# Patient Record
Sex: FEMALE | Race: BLACK OR AFRICAN AMERICAN | Employment: OTHER | URBAN - METROPOLITAN AREA
[De-identification: names, ages, dates, MRNs, and addresses within clinical notes are randomized per-mention and may not be internally consistent; named-entity substitution may affect disease eponyms.]

---

## 2020-07-07 ENCOUNTER — APPOINTMENT (OUTPATIENT)
Dept: GENERAL RADIOLOGY | Age: 55
End: 2020-07-07
Attending: EMERGENCY MEDICINE
Payer: MEDICARE

## 2020-07-07 ENCOUNTER — HOSPITAL ENCOUNTER (EMERGENCY)
Age: 55
Discharge: HOME | End: 2020-07-07
Attending: EMERGENCY MEDICINE
Payer: MEDICARE

## 2020-07-07 VITALS
OXYGEN SATURATION: 99 % | TEMPERATURE: 98.2 F | SYSTOLIC BLOOD PRESSURE: 190 MMHG | RESPIRATION RATE: 18 BRPM | HEIGHT: 63 IN | DIASTOLIC BLOOD PRESSURE: 128 MMHG | WEIGHT: 209 LBS | HEART RATE: 82 BPM | BODY MASS INDEX: 37.03 KG/M2

## 2020-07-07 DIAGNOSIS — I10 HYPERTENSION, UNSPECIFIED TYPE: ICD-10-CM

## 2020-07-07 DIAGNOSIS — M17.11 PRIMARY OSTEOARTHRITIS OF RIGHT KNEE: ICD-10-CM

## 2020-07-07 DIAGNOSIS — M16.7 OTHER SECONDARY OSTEOARTHRITIS OF RIGHT HIP: Primary | ICD-10-CM

## 2020-07-07 LAB
ANION GAP SERPL CALC-SCNC: 7 MMOL/L (ref 7–16)
BASOPHILS # BLD: 0 K/UL (ref 0–0.2)
BASOPHILS NFR BLD: 0 % (ref 0–2)
BUN SERPL-MCNC: 14 MG/DL (ref 6–23)
CALCIUM SERPL-MCNC: 9.3 MG/DL (ref 8.3–10.4)
CHLORIDE SERPL-SCNC: 105 MMOL/L (ref 98–107)
CO2 SERPL-SCNC: 26 MMOL/L (ref 21–32)
CREAT SERPL-MCNC: 1.02 MG/DL (ref 0.6–1)
DIFFERENTIAL METHOD BLD: ABNORMAL
EOSINOPHIL # BLD: 0.1 K/UL (ref 0–0.8)
EOSINOPHIL NFR BLD: 1 % (ref 0.5–7.8)
ERYTHROCYTE [DISTWIDTH] IN BLOOD BY AUTOMATED COUNT: 12.6 % (ref 11.9–14.6)
GLUCOSE SERPL-MCNC: 305 MG/DL (ref 65–100)
HCT VFR BLD AUTO: 46 % (ref 35.8–46.3)
HGB BLD-MCNC: 14.2 G/DL (ref 11.7–15.4)
IMM GRANULOCYTES # BLD AUTO: 0 K/UL (ref 0–0.5)
IMM GRANULOCYTES NFR BLD AUTO: 0 % (ref 0–5)
LYMPHOCYTES # BLD: 2.8 K/UL (ref 0.5–4.6)
LYMPHOCYTES NFR BLD: 40 % (ref 13–44)
MCH RBC QN AUTO: 28.9 PG (ref 26.1–32.9)
MCHC RBC AUTO-ENTMCNC: 30.9 G/DL (ref 31.4–35)
MCV RBC AUTO: 93.7 FL (ref 79.6–97.8)
MONOCYTES # BLD: 0.5 K/UL (ref 0.1–1.3)
MONOCYTES NFR BLD: 7 % (ref 4–12)
NEUTS SEG # BLD: 3.5 K/UL (ref 1.7–8.2)
NEUTS SEG NFR BLD: 51 % (ref 43–78)
NRBC # BLD: 0 K/UL (ref 0–0.2)
PLATELET # BLD AUTO: 326 K/UL (ref 150–450)
PMV BLD AUTO: 10.5 FL (ref 9.4–12.3)
POTASSIUM SERPL-SCNC: 4.8 MMOL/L (ref 3.5–5.1)
RBC # BLD AUTO: 4.91 M/UL (ref 4.05–5.2)
SODIUM SERPL-SCNC: 138 MMOL/L (ref 136–145)
WBC # BLD AUTO: 6.9 K/UL (ref 4.3–11.1)

## 2020-07-07 PROCEDURE — 80048 BASIC METABOLIC PNL TOTAL CA: CPT

## 2020-07-07 PROCEDURE — 96374 THER/PROPH/DIAG INJ IV PUSH: CPT

## 2020-07-07 PROCEDURE — 73502 X-RAY EXAM HIP UNI 2-3 VIEWS: CPT

## 2020-07-07 PROCEDURE — 74011250636 HC RX REV CODE- 250/636: Performed by: EMERGENCY MEDICINE

## 2020-07-07 PROCEDURE — 73562 X-RAY EXAM OF KNEE 3: CPT

## 2020-07-07 PROCEDURE — 99283 EMERGENCY DEPT VISIT LOW MDM: CPT

## 2020-07-07 PROCEDURE — 85025 COMPLETE CBC W/AUTO DIFF WBC: CPT

## 2020-07-07 RX ORDER — DEXAMETHASONE 2 MG/1
TABLET ORAL
Qty: 32 TAB | Refills: 0 | Status: SHIPPED | OUTPATIENT
Start: 2020-07-07 | End: 2021-06-09

## 2020-07-07 RX ORDER — HYDROCODONE BITARTRATE AND ACETAMINOPHEN 5; 325 MG/1; MG/1
1 TABLET ORAL
Qty: 12 TAB | Refills: 0 | Status: SHIPPED | OUTPATIENT
Start: 2020-07-07 | End: 2020-07-10

## 2020-07-07 RX ORDER — HYDROMORPHONE HYDROCHLORIDE 1 MG/ML
1 INJECTION, SOLUTION INTRAMUSCULAR; INTRAVENOUS; SUBCUTANEOUS
Status: DISCONTINUED | OUTPATIENT
Start: 2020-07-07 | End: 2020-07-08 | Stop reason: HOSPADM

## 2020-07-07 RX ORDER — CLONIDINE HYDROCHLORIDE 0.1 MG/1
0.2 TABLET ORAL 2 TIMES DAILY
Qty: 120 TAB | Refills: 0 | Status: SHIPPED | OUTPATIENT
Start: 2020-07-07 | End: 2020-08-06

## 2020-07-07 RX ADMIN — HYDROMORPHONE HYDROCHLORIDE 1 MG: 1 INJECTION, SOLUTION INTRAMUSCULAR; INTRAVENOUS; SUBCUTANEOUS at 21:23

## 2020-07-07 NOTE — ED TRIAGE NOTES
Arrives with face mask in place. Reports right groin pain radiating down into thigh extending to right knee. Onset approx 1week ago. Denies injury/trauma. Denies swelling to site. Denies urinary symptoms. Describes as \"throbbing\". Attempted diclofenac, motrin, tylenol, naproxen without relief. +pms to right foot. HTN on arrival, hx of, has not taken meds today. Seen at [facility] 6/10 for ace inhibitor cough, taken off lisinopril at that time. Instructed to take clonidine bid.

## 2020-07-08 NOTE — DISCHARGE INSTRUCTIONS
Patient Education        Arthritis: Care Instructions  Overview  Arthritis, also called osteoarthritis, is a breakdown of the cartilage that cushions your joints. When the cartilage wears down, your bones rub against each other. This causes pain and stiffness. Many people have some arthritis as they age. Arthritis most often affects the joints of the spine, hands, hips, knees, or feet. Arthritis never goes away completely. But medicine and home treatment can help reduce pain and help you stay active. Follow-up care is a key part of your treatment and safety. Be sure to make and go to all appointments, and call your doctor if you are having problems. It's also a good idea to know your test results and keep a list of the medicines you take. How can you care for yourself at home? · Stay at a healthy weight. Being overweight puts extra strain on your joints. · Talk to your doctor or physical therapist about exercises that will help ease joint pain. ? Stretch. You may enjoy gentle forms of yoga to help keep your joints and muscles flexible. ? Walk instead of jog. Other types of exercise that are less stressful on the joints include riding a bike, swimming, shonna chi, or water exercise. ? Lift weights. Strong muscles help reduce stress on your joints. Stronger thigh muscles, for example, take some of the stress off of the knees and hips. Learn the right way to lift weights so you don't make joint pain worse. · Take your medicines exactly as prescribed. Call your doctor if you think you are having a problem with your medicine. · Take pain medicines exactly as directed. ? If the doctor gave you a prescription medicine for pain, take it as prescribed. ? If you are not taking a prescription pain medicine, ask your doctor if you can take an over-the-counter medicine. · Use a cane, crutch, walker, or another device if you need help to get around. These can help rest your joints.  You also can use other things to make life easier, such as a higher toilet seat and padded handles on kitchen utensils. · Do not sit in low chairs. They can make it hard to get up. · Put heat or cold on your sore joints as needed. Use whichever helps you most. You can also switch between hot and cold packs. ? Apply heat 2 or 3 times a day for 20 to 30 minutes--using a heating pad, hot shower, or hot pack--to relieve pain and stiffness. But don't use heat on a swollen joint. ? Put ice or a cold pack on your sore joint for 10 to 20 minutes at a time. Put a thin cloth between the ice and your skin. When should you call for help? Call your doctor now or seek immediate medical care if:  · You have sudden swelling, warmth, or pain in any joint. · You have joint pain and a fever or rash. · You have such bad pain that you cannot use a joint. Watch closely for changes in your health, and be sure to contact your doctor if:  · You have mild joint symptoms that continue even with more than 6 weeks of care at home. · You have stomach pain or other problems with your medicine. Where can you learn more? Go to http://marlaConvrrtalisha.info/  Enter T912 in the search box to learn more about \"Arthritis: Care Instructions. \"  Current as of: December 9, 2019               Content Version: 12.5  © 8789-0230 [employer]. Care instructions adapted under license by [employer] (which disclaims liability or warranty for this information). If you have questions about a medical condition or this instruction, always ask your healthcare professional. [place] [address] any warranty or liability for your use of this information. Patient Education        DASH Diet: Care Instructions  Your Care Instructions     The DASH diet is an eating plan that can help lower your blood pressure. DASH stands for Dietary Approaches to Stop Hypertension. Hypertension is high blood pressure.   The DASH diet focuses on eating foods that are high in calcium, potassium, and magnesium. These nutrients can lower blood pressure. The foods that are highest in these nutrients are fruits, vegetables, low-fat dairy products, nuts, seeds, and legumes. But taking calcium, potassium, and magnesium supplements instead of eating foods that are high in those nutrients does not have the same effect. The DASH diet also includes whole grains, fish, and poultry. The DASH diet is one of several lifestyle changes your doctor may recommend to lower your high blood pressure. Your doctor may also want you to decrease the amount of sodium in your diet. Lowering sodium while following the DASH diet can lower blood pressure even further than just the DASH diet alone. Follow-up care is a key part of your treatment and safety. Be sure to make and go to all appointments, and call your doctor if you are having problems. It's also a good idea to know your test results and keep a list of the medicines you take. How can you care for yourself at home? Following the DASH diet  · Eat 4 to 5 servings of fruit each day. A serving is 1 medium-sized piece of fruit, ½ cup chopped or canned fruit, 1/4 cup dried fruit, or 4 ounces (½ cup) of fruit juice. Choose fruit more often than fruit juice. · Eat 4 to 5 servings of vegetables each day. A serving is 1 cup of lettuce or raw leafy vegetables, ½ cup of chopped or cooked vegetables, or 4 ounces (½ cup) of vegetable juice. Choose vegetables more often than vegetable juice. · Get 2 to 3 servings of low-fat and fat-free dairy each day. A serving is 8 ounces of milk, 1 cup of yogurt, or 1 ½ ounces of cheese. · Eat 6 to 8 servings of grains each day. A serving is 1 slice of bread, 1 ounce of dry cereal, or ½ cup of cooked rice, pasta, or cooked cereal. Try to choose whole-grain products as much as possible. · Limit lean meat, poultry, and fish to 2 servings each day.  A serving is 3 ounces, about the size of a deck of cards.  · Eat 4 to 5 servings of nuts, seeds, and legumes (cooked dried beans, lentils, and split peas) each week. A serving is 1/3 cup of nuts, 2 tablespoons of seeds, or ½ cup of cooked beans or peas. · Limit fats and oils to 2 to 3 servings each day. A serving is 1 teaspoon of vegetable oil or 2 tablespoons of salad dressing. · Limit sweets and added sugars to 5 servings or less a week. A serving is 1 tablespoon jelly or jam, ½ cup sorbet, or 1 cup of lemonade. · Eat less than 2,300 milligrams (mg) of sodium a day. If you limit your sodium to 1,500 mg a day, you can lower your blood pressure even more. Tips for success  · Start small. Do not try to make dramatic changes to your diet all at once. You might feel that you are missing out on your favorite foods and then be more likely to not follow the plan. Make small changes, and stick with them. Once those changes become habit, add a few more changes. · Try some of the following:  ? Make it a goal to eat a fruit or vegetable at every meal and at snacks. This will make it easy to get the recommended amount of fruits and vegetables each day. ? Try yogurt topped with fruit and nuts for a snack or healthy dessert. ? Add lettuce, tomato, cucumber, and onion to sandwiches. ? Combine a ready-made pizza crust with low-fat mozzarella cheese and lots of vegetable toppings. Try using tomatoes, squash, spinach, broccoli, carrots, cauliflower, and onions. ? Have a variety of cut-up vegetables with a low-fat dip as an appetizer instead of chips and dip. ? Sprinkle sunflower seeds or chopped almonds over salads. Or try adding chopped walnuts or almonds to cooked vegetables. ? Try some vegetarian meals using beans and peas. Add garbanzo or kidney beans to salads. Make burritos and tacos with mashed weldon beans or black beans. Where can you learn more?   Go to http://marla-alisha.info/  Enter H967 in the search box to learn more about \"DASH Diet: Care Instructions. \"  Current as of: December 16, 2019               Content Version: 12.5  © 4125-0784 [employer]. Care instructions adapted under license by [employer] (which disclaims liability or warranty for this information). If you have questions about a medical condition or this instruction, always ask your healthcare professional. [address]  any warranty or liability for your use of this information. Patient Education        Hip Arthritis: Care Instructions  Your Care Instructions     Arthritis, also called osteoarthritis, is a breakdown of the tissue (cartilage) that cushions your joints. Many people have some arthritis as they age. When the cartilage in your hip joints wears down, your hip bone rubs against the hip socket. This causes pain and stiffness. Work with your doctor to find the right mix of treatments for your arthritis. There are things you can do at home to protect your hip joints, ease your pain, and help you stay active. But if your arthritis becomes so bad that you cannot walk, you may need surgery to replace the hip joint. Follow-up care is a key part of your treatment and safety. Be sure to make and go to all appointments, and call your doctor if you are having problems. It's also a good idea to know your test results and keep a list of the medicines you take. How can you care for yourself at home? · Stay at a healthy weight. Being overweight puts extra strain on your hip joints. · Talk to your doctor or physical therapist about exercises that will help ease hip pain. These tips may help. ? Stretch to help prevent stiffness and to prevent injury before you exercise. You may enjoy gentle forms of yoga to help keep your joints and muscles flexible. ? Walk instead of jog. Other types of exercise that are less stressful on the joints include riding a bike, swimming, and doing water exercise. ? Lift weights.  Strong muscles help reduce stress on your joints. Stronger thigh muscles, for example, take some of the stress off of the knees and hips. Learn the right way to lift weights so you do not make joint pain worse. · Take pain medicines exactly as directed. ? If the doctor gave you a prescription medicine for pain, take it as prescribed. ? If you are not taking a prescription pain medicine, ask your doctor if you can take an over-the-counter medicine. · Use a cane, crutch, walker, or another device if you need help to get around. These can help rest your hips. You also can use other things to make life easier, such as a higher toilet seat. · Do not sit in low chairs, which can make it painful to get up. · Put heat or cold on your sore hips as needed. Use whichever helps you most. You also can go back and forth between hot and cold packs. ? Apply heat 2 or 3 times a day for 20 to 30 minutes--using a heating pad, hot shower, or hot pack--to relieve pain and stiffness. ? Put ice or a cold pack on your sore hips for 10 to 20 minutes at a time to numb the area. Put a thin cloth between the ice and your skin. · Think about talking to your doctor about using capsaicin, a cream you apply to the skin for pain relief. When should you call for help? Call your doctor now or seek immediate medical care if:  · You have sudden swelling, warmth, or pain in any joint. · You have joint pain and a fever or rash. · You have such bad pain that you cannot use the joint. Watch closely for changes in your health, and be sure to contact your doctor if:  · You have mild joint symptoms that continue even with more than 6 weeks of care at home. · You do not get better as expected. · You have stomach pain or other problems with your medicine. Where can you learn more? Go to http://www.gray.com/  Enter T640 in the search box to learn more about \"Hip Arthritis: Care Instructions. \"  Current as of: December 9, 1383               WHTWYAB Version: 12.5  © 2225-9240 [employer]. Care instructions adapted under license by [employer] (which disclaims liability or warranty for this information). If you have questions about a medical condition or this instruction, always ask your healthcare professional. [address] any warranty or liability for your use of this information.

## 2020-07-08 NOTE — ED NOTES
I have reviewed discharge instructions with the patient. The patient verbalized understanding. Patient left ED via Discharge Method: wheelchair to Home with family. Opportunity for questions and clarification provided. Patient given 2 scripts. To continue your aftercare when you leave the hospital, you may receive an automated call from our care team to check in on how you are doing. This is a free service and part of our promise to provide the best care and service to meet your aftercare needs.  If you have questions, or wish to unsubscribe from this service please call [phone]. Thank you for Choosing our [facility] Emergency Department.

## 2020-09-10 ENCOUNTER — HOSPITAL ENCOUNTER (OUTPATIENT)
Dept: GENERAL RADIOLOGY | Age: 55
Discharge: HOME | End: 2020-09-10
Attending: ORTHOPAEDIC SURGERY
Payer: MEDICARE

## 2020-09-10 VITALS
TEMPERATURE: 98.1 F | HEART RATE: 78 BPM | OXYGEN SATURATION: 98 % | RESPIRATION RATE: 18 BRPM | DIASTOLIC BLOOD PRESSURE: 94 MMHG | HEIGHT: 67 IN | SYSTOLIC BLOOD PRESSURE: 145 MMHG | WEIGHT: 209 LBS | BODY MASS INDEX: 32.8 KG/M2

## 2020-09-10 DIAGNOSIS — M16.11 LOCALIZED OSTEOARTHROSIS OF RIGHT HIP: ICD-10-CM

## 2020-09-10 PROCEDURE — 74011000250 HC RX REV CODE- 250: Performed by: ORTHOPAEDIC SURGERY

## 2020-09-10 PROCEDURE — 74011000636 HC RX REV CODE- 636: Performed by: ORTHOPAEDIC SURGERY

## 2020-09-10 PROCEDURE — 74011250636 HC RX REV CODE- 250/636: Performed by: ORTHOPAEDIC SURGERY

## 2020-09-10 PROCEDURE — 20610 DRAIN/INJ JOINT/BURSA W/O US: CPT

## 2020-09-10 RX ORDER — LIDOCAINE HYDROCHLORIDE 20 MG/ML
1 INJECTION, SOLUTION EPIDURAL; INFILTRATION; INTRACAUDAL; PERINEURAL ONCE
Status: ACTIVE | OUTPATIENT
Start: 2020-09-10 | End: 2020-09-10

## 2020-09-10 RX ORDER — BUPIVACAINE HYDROCHLORIDE 2.5 MG/ML
1 INJECTION, SOLUTION EPIDURAL; INFILTRATION; INTRACAUDAL
Status: COMPLETED | OUTPATIENT
Start: 2020-09-10 | End: 2020-09-10

## 2020-09-10 RX ORDER — LIDOCAINE HYDROCHLORIDE 20 MG/ML
20 INJECTION, SOLUTION EPIDURAL; INFILTRATION; INTRACAUDAL; PERINEURAL ONCE
Status: ACTIVE | OUTPATIENT
Start: 2020-09-10 | End: 2020-09-10

## 2020-09-10 RX ORDER — LIDOCAINE HYDROCHLORIDE 20 MG/ML
1.5 INJECTION, SOLUTION EPIDURAL; INFILTRATION; INTRACAUDAL; PERINEURAL ONCE
Status: ACTIVE | OUTPATIENT
Start: 2020-09-10 | End: 2020-09-10

## 2020-09-10 RX ORDER — LIDOCAINE HYDROCHLORIDE 20 MG/ML
0.2 INJECTION, SOLUTION INFILTRATION; PERINEURAL
Status: ACTIVE | OUTPATIENT
Start: 2020-09-10 | End: 2020-09-10

## 2020-09-10 RX ORDER — LIDOCAINE HYDROCHLORIDE 20 MG/ML
1 INJECTION, SOLUTION EPIDURAL; INFILTRATION; INTRACAUDAL; PERINEURAL ONCE
Status: DISCONTINUED | OUTPATIENT
Start: 2020-09-10 | End: 2020-09-10 | Stop reason: SDUPTHER

## 2020-09-10 RX ORDER — METFORMIN HYDROCHLORIDE 1000 MG/1
1000 TABLET ORAL 2 TIMES DAILY WITH MEALS
COMMUNITY

## 2020-09-10 RX ORDER — DICLOFENAC POTASSIUM 50 MG/1
50 TABLET, FILM COATED ORAL
COMMUNITY

## 2020-09-10 RX ORDER — LOSARTAN POTASSIUM 50 MG/1
100 TABLET ORAL DAILY
COMMUNITY

## 2020-09-10 RX ORDER — ASPIRIN 81 MG/1
81 TABLET ORAL DAILY
COMMUNITY
End: 2021-06-17

## 2020-09-10 RX ORDER — METHYLPREDNISOLONE ACETATE 80 MG/ML
80 INJECTION, SUSPENSION INTRA-ARTICULAR; INTRALESIONAL; INTRAMUSCULAR; SOFT TISSUE ONCE
Status: COMPLETED | OUTPATIENT
Start: 2020-09-10 | End: 2020-09-10

## 2020-09-10 RX ORDER — QUETIAPINE FUMARATE 400 MG/1
400 TABLET, FILM COATED ORAL
COMMUNITY

## 2020-09-10 RX ORDER — METHYLPREDNISOLONE ACETATE 40 MG/ML
40 INJECTION, SUSPENSION INTRA-ARTICULAR; INTRALESIONAL; INTRAMUSCULAR; SOFT TISSUE
Status: ACTIVE | OUTPATIENT
Start: 2020-09-10 | End: 2020-09-10

## 2020-09-10 RX ADMIN — BUPIVACAINE HYDROCHLORIDE 4 MG: 2.5 INJECTION, SOLUTION EPIDURAL; INFILTRATION; INTRACAUDAL; PERINEURAL at 10:36

## 2020-09-10 RX ADMIN — METHYLPREDNISOLONE ACETATE 80 MG: 80 INJECTION, SUSPENSION INTRA-ARTICULAR; INTRALESIONAL; INTRAMUSCULAR; SOFT TISSUE at 11:40

## 2020-09-10 RX ADMIN — IOPAMIDOL 5 ML: 612 INJECTION, SOLUTION INTRATHECAL at 10:38

## 2021-05-12 ENCOUNTER — HOSPITAL ENCOUNTER (OUTPATIENT)
Dept: SURGERY | Age: 56
Discharge: HOME | End: 2021-05-12

## 2021-06-09 ENCOUNTER — HOSPITAL ENCOUNTER (OUTPATIENT)
Dept: SURGERY | Age: 56
Discharge: HOME | End: 2021-06-09
Attending: ORTHOPAEDIC SURGERY
Payer: MEDICARE

## 2021-06-09 VITALS
SYSTOLIC BLOOD PRESSURE: 172 MMHG | HEIGHT: 64 IN | BODY MASS INDEX: 42.27 KG/M2 | DIASTOLIC BLOOD PRESSURE: 104 MMHG | TEMPERATURE: 98.1 F | HEART RATE: 66 BPM | WEIGHT: 247.6 LBS | RESPIRATION RATE: 16 BRPM | OXYGEN SATURATION: 99 %

## 2021-06-09 LAB
ANION GAP SERPL CALC-SCNC: 4 MMOL/L (ref 7–16)
ATRIAL RATE: 81 BPM
BACTERIA SPEC CULT: NORMAL
BUN SERPL-MCNC: 10 MG/DL (ref 6–23)
CALCIUM SERPL-MCNC: 9.2 MG/DL (ref 8.3–10.4)
CALCULATED P AXIS, ECG09: 74 DEGREES
CALCULATED R AXIS, ECG10: 66 DEGREES
CALCULATED T AXIS, ECG11: 78 DEGREES
CHLORIDE SERPL-SCNC: 110 MMOL/L (ref 98–107)
CO2 SERPL-SCNC: 26 MMOL/L (ref 21–32)
CREAT SERPL-MCNC: 1.08 MG/DL (ref 0.6–1)
DIAGNOSIS, 93000: NORMAL
ERYTHROCYTE [DISTWIDTH] IN BLOOD BY AUTOMATED COUNT: 13.9 % (ref 11.9–14.6)
GLUCOSE SERPL-MCNC: 89 MG/DL (ref 65–100)
HCT VFR BLD AUTO: 37.7 % (ref 35.8–46.3)
HGB BLD-MCNC: 11.6 G/DL (ref 11.7–15.4)
MCH RBC QN AUTO: 29 PG (ref 26.1–32.9)
MCHC RBC AUTO-ENTMCNC: 30.8 G/DL (ref 31.4–35)
MCV RBC AUTO: 94.3 FL (ref 79.6–97.8)
NRBC # BLD: 0 K/UL (ref 0–0.2)
P-R INTERVAL, ECG05: 178 MS
PLATELET # BLD AUTO: 304 K/UL (ref 150–450)
PMV BLD AUTO: 9.3 FL (ref 9.4–12.3)
POTASSIUM SERPL-SCNC: 4.7 MMOL/L (ref 3.5–5.1)
Q-T INTERVAL, ECG07: 396 MS
QRS DURATION, ECG06: 84 MS
QTC CALCULATION (BEZET), ECG08: 460 MS
RBC # BLD AUTO: 4 M/UL (ref 4.05–5.2)
SERVICE CMNT-IMP: NORMAL
SODIUM SERPL-SCNC: 140 MMOL/L (ref 136–145)
VENTRICULAR RATE, ECG03: 81 BPM
WBC # BLD AUTO: 5.6 K/UL (ref 4.3–11.1)

## 2021-06-09 PROCEDURE — 85027 COMPLETE CBC AUTOMATED: CPT

## 2021-06-09 PROCEDURE — 80048 BASIC METABOLIC PNL TOTAL CA: CPT

## 2021-06-09 PROCEDURE — 77030027138 HC INCENT SPIROMETER -A

## 2021-06-09 PROCEDURE — 36415 COLL VENOUS BLD VENIPUNCTURE: CPT

## 2021-06-09 PROCEDURE — 87641 MR-STAPH DNA AMP PROBE: CPT

## 2021-06-09 PROCEDURE — 93005 ELECTROCARDIOGRAM TRACING: CPT | Performed by: ANESTHESIOLOGY

## 2021-06-09 RX ORDER — GLIMEPIRIDE 2 MG/1
2 TABLET ORAL
COMMUNITY

## 2021-06-09 RX ORDER — CHOLECALCIFEROL (VITAMIN D3) 125 MCG
5000 CAPSULE ORAL DAILY
COMMUNITY

## 2021-06-09 RX ORDER — CHOLECALCIFEROL (VITAMIN D3) 125 MCG
10 CAPSULE ORAL
COMMUNITY

## 2021-06-09 RX ORDER — HYDROCODONE BITARTRATE AND ACETAMINOPHEN 7.5; 325 MG/1; MG/1
1 TABLET ORAL
COMMUNITY
End: 2021-06-17

## 2021-06-09 NOTE — PERIOP NOTES
PLEASE CONTINUE TAKING ALL PRESCRIPTION MEDICATIONS UP TO THE DAY OF SURGERY UNLESS OTHERWISE DIRECTED BELOW. DISCONTINUE all vitamins and supplements 21 days prior to surgery. DISCONTINUE Non-Steriodal Anti-Inflammatory (NSAIDS) such as Advil and Aleve 5 days prior to surgery. Home Medications to take  the day of surgery      Lortab if needed           Home Medications   to Hold     Hold Diclofenac and Aspirin for 5 days prior to surgery. Comments      On the day before surgery please take Acetaminophen 1000mg in the morning and then again before bed. You may substitute for Tylenol 650 mg.      Bring Bydureon to hospital.        Please do not bring home medications with you on the day of surgery unless otherwise directed by your nurse. If you are instructed to bring home medications, please give them to your nurse as they will be administered by the nursing staff. If you have any questions, please call Hua Moran [phone]     A copy of this note was provided to the patient for reference.

## 2021-06-09 NOTE — PERIOP NOTES
Patient verified name and . Order for consent not found in EHR ; patient verified. Type 3 surgery, Walk in assessment complete. Pt 45 min late for appt. Went to wrong location - very anxious. Labs per surgeon: no orders in emr from surgeon at time of assessment  Labs per anesthesia protocol: cbc,bmp  EKG:done today - will have MDA review. Patient COVID test date 21; Patient aware. The testing center [facility], [address]  and telephone number [phone] provided to the patient if not appointment found. MRSA/MSSA swab collected; pharmacy to review and dose antibiotic as appropriate. Hospital approved surgical skin cleanser and instructions to return bottle on DOS given per hospital policy. Patient provided with handouts including Guide to Surgery, Pain Management, Hand Hygiene, Blood Transfusion Education, and [facility] Brochure. Patient answered medical/surgical history questions at their best of ability. All prior to admission medications documented in [facility]. Original medication prescription bottle  visualized during patient appointment. Pt instructed to only take her Tylenol PM before bed -- no additional.    Patient instructed to hold all vitamins 3 weeks prior to surgery and NSAIDS 5 days prior to surgery. Patient teach back successful and patient demonstrates knowledge of instruction.

## 2021-06-10 NOTE — PERIOP NOTES
EKG requested from [facility] EKG department to be faxed to [phone]. Lab results within anesthesia guidelines.

## 2021-06-13 PROBLEM — M16.11 OSTEOARTHRITIS OF RIGHT HIP: Chronic | Status: ACTIVE | Noted: 2021-06-13

## 2021-06-14 ENCOUNTER — ANESTHESIA EVENT (OUTPATIENT)
Dept: SURGERY | Age: 56
DRG: 470 | End: 2021-06-14
Payer: MEDICARE

## 2021-06-14 NOTE — H&P
Zeus Belden  History and physical     Subjective  Problem List:     1) Right hip pain/DJD   2) Right knee pain     This patient presents today for evaluation of right hip pain. The patient comes in today for evaluation, history and physical, and surgical consent signing. The surgical procedure was reviewed in detail with the patient. The risks, including but not limited to anesthesia, infection, deep vein thrombosis, pulmonary embolus, injury to vessels, tendons, and nerves, paralysis, stroke, heart attack, loss of limb, and death were discussed. The patient understands the post operative course and all questions were answered. No guarantees are made and all alternatives are given. The patient wishes to proceed with the surgery. Appropriate literature and relevant material was reviewed with the patient. Surgical procedure: right total hip replacement    Allergies: wellbutrin, tylox. Family health history: heart disease-father, hypertension-both parents, diabetes-both parents and daughter, cancer-both parents, osteoarthritis-both parents. Major events: cholecystectomy,  section, hysterectomy, Left knee replacement- DR. Everardo Roy, EGD, colonoscopy, bilateral CTR-Dr. Sri Bowden. Ongoing medical problems: sciatica, obesity, anemia, migraines, acid reflux, hypercholesterolemia, chronic kidney disease, bipolar depression, asthma, insomnia, hypertension, diabetes. Preventive care: PCP: Dr. Hans Graned. Social history: Patient denies tobacco and EtOH use. Objective  Vital Signs: Height 63 inches; Weight 249 lbs; /100 mmHg; Temp 98.5 F; Pulse 86 bpm; Oxygen Saturation 97 %. Patient is a 54year old female who appears her given age and is in no apparent distress. Oriented to person, place, and time. Mood and affect are appropriate for age and situation. Assessment of respiratory effort reveals even and nonlabored respirations.        GEN:NAD    Lungs clear to auscultation bilaterally. Heart rate regular without murmur heard to auscultation. The patient exhibits antalgic reciprocal gait, and is able to get onto and off of the examination table. Right Hip X-Rays w/pelvis (AP & Lat views - CPT [id number]) taken 4-22-21 revealed: severe joint space narrowing-bone on bone, with marginal osteophytes. Severe osteoarthritis with no acute process visualized. Right Hip Examination:     The inspection reveals no signs of accident or injury. No warmth or erythema noted. No palpable cords. Palpation of the hip reveals tenderness to the groin area. Hip has decreased ROM with pain. Hip Internal Rotation with Hip Flexed at 90 degrees (active): 30 degrees, with pain. Hip External Rotation with Hip Flexed at 90 degrees (active): 10 degrees, with pain. Pain throughout the arc of motion. The muscle strength is 5/5. The muscle tone is normal.    Vascular: Peripheral pulses normal 2/2 lower extremities . Neurologic: Sensation is intact and symmetrical in all dermatomes lower extremities. Assessment    DIAGNOSIS:        Pain in right hip [ICD-10: M25.551], [ICD-9: 719.45], [SNOMED: 939088553339992]        Primary osteoarthritis of right hip [ICD-10: M16.11], [ICD-9: 715.15], [SNOMED: 097339672]        Pre-op evaluation [ICD-10: J47.433], [SNOMED: 418387843]  Plan  We discussed the pathophysiology of the diagnosis and options for treatment. We reviewed the conservative treatment options in detail. We discussed the surgical option(s) (right total hip replacement). We have talked about the complications of surgery, including the possibility of damage to nerves, arteries, vessels and tendons, bleeding, infection, the possibility of sustaining medical problems, even death. We have talked about the possibility that the condition may not improve after surgery  or that it could actually be worse.  The patient seems to understand and accept these possible complications. The patient understands and wishes to proceed with surgery at this time. Informed surgical consent obtained. Surgery will be performed at [address] on 6-15-21. The patient uses [facility] in [facility] for her pharmacy. All questions answered at this time. The patient knows to contact the office with any questions or concerns. VERIFICATION OF ANCILLARY DOCUMENTATION: The portions of the chart completed by ancillary personnel were reviewed by the physician. Rustam Crespo RTC : post-op. MEDICATIONS:        Melatonin 10 MG Oral Tablet one daily.         Actos 15 MG Oral Tablet 1 tablet (15 mg) orally daily        SEROquel 400 MG Oral Tablet 1 tablet (400 mg) orally daily at bedtime        Diclofenac Sodium 50 MG Oral Tablet Delayed Release 1 tablet (50 mg) orally 3 times per day        Aspirin 81 MG Oral Tablet Delayed Release 1 tablet (81 mg) orally daily        cloNIDine HCl 0.1 MG Oral Tablet 2 tablets (0.2 mg) orally 2 times per day        metFORMIN HCl 1000 MG Oral Tablet 1 tablet (1,000 mg) orally 2 times per day with meals        Ambien 5 MG Oral Tablet 1 tablet (5 mg) orally daily at bedtime        Losartan Potassium 100 MG Oral Tablet 1 tablet (100 mg) orally daily        Daily Multivitamin Oral Capsule one daily        HYDROcodone-Acetaminophen 7.5-325 MG Oral Tablet one po QID        Biotin 5000 MCG Oral Capsule one po BID        Tylenol PM Extra Strength 500-25 MG Oral Tablet PRN        Glimepiride 2 MG Oral Tablet 1 tablet (2 mg) orally daily with breakfast or the first main meal of the day        Bydureon BCise 2 MG/0.85ML Subcutaneous Auto-injector 2 mg subcutaneously weekly

## 2021-06-15 ENCOUNTER — HOSPITAL ENCOUNTER (INPATIENT)
Age: 56
LOS: 2 days | Discharge: HOME HEALTH | DRG: 470 | End: 2021-06-17
Attending: ORTHOPAEDIC SURGERY | Admitting: ORTHOPAEDIC SURGERY
Payer: MEDICARE

## 2021-06-15 ENCOUNTER — ANESTHESIA (OUTPATIENT)
Dept: SURGERY | Age: 56
DRG: 470 | End: 2021-06-15
Payer: MEDICARE

## 2021-06-15 ENCOUNTER — HOME HEALTH ADMISSION (OUTPATIENT)
Dept: HOME HEALTH SERVICES | Facility: HOME HEALTH | Age: 56
End: 2021-06-15
Payer: MEDICARE

## 2021-06-15 ENCOUNTER — APPOINTMENT (OUTPATIENT)
Dept: GENERAL RADIOLOGY | Age: 56
DRG: 470 | End: 2021-06-15
Attending: ORTHOPAEDIC SURGERY
Payer: MEDICARE

## 2021-06-15 LAB
ABO + RH BLD: NORMAL
BLOOD GROUP ANTIBODIES SERPL: NORMAL
GLUCOSE BLD STRIP.AUTO-MCNC: 112 MG/DL (ref 65–100)
GLUCOSE BLD STRIP.AUTO-MCNC: 130 MG/DL (ref 65–100)
GLUCOSE BLD STRIP.AUTO-MCNC: 155 MG/DL (ref 65–100)
GLUCOSE BLD STRIP.AUTO-MCNC: 32 MG/DL (ref 65–100)
GLUCOSE BLD STRIP.AUTO-MCNC: 37 MG/DL (ref 65–100)
GLUCOSE BLD STRIP.AUTO-MCNC: 41 MG/DL (ref 65–100)
GLUCOSE BLD STRIP.AUTO-MCNC: 60 MG/DL (ref 65–100)
GLUCOSE BLD STRIP.AUTO-MCNC: 61 MG/DL (ref 65–100)
GLUCOSE BLD STRIP.AUTO-MCNC: 63 MG/DL (ref 65–100)
GLUCOSE BLD STRIP.AUTO-MCNC: 70 MG/DL (ref 65–100)
GLUCOSE BLD STRIP.AUTO-MCNC: 73 MG/DL (ref 65–100)
GLUCOSE BLD STRIP.AUTO-MCNC: 80 MG/DL (ref 65–100)
SERVICE CMNT-IMP: ABNORMAL
SERVICE CMNT-IMP: NORMAL
SPECIMEN EXP DATE BLD: NORMAL

## 2021-06-15 PROCEDURE — 74011250636 HC RX REV CODE- 250/636: Performed by: ANESTHESIOLOGY

## 2021-06-15 PROCEDURE — 0SR902A REPLACEMENT OF RIGHT HIP JOINT WITH METAL ON POLYETHYLENE SYNTHETIC SUBSTITUTE, UNCEMENTED, OPEN APPROACH: ICD-10-PCS | Performed by: ORTHOPAEDIC SURGERY

## 2021-06-15 PROCEDURE — 97165 OT EVAL LOW COMPLEX 30 MIN: CPT

## 2021-06-15 PROCEDURE — 77030040922 HC BLNKT HYPOTHRM STRY -A: Performed by: ANESTHESIOLOGY

## 2021-06-15 PROCEDURE — 74011000302 HC RX REV CODE- 302: Performed by: ORTHOPAEDIC SURGERY

## 2021-06-15 PROCEDURE — 77030003666 HC NDL SPINAL BD -A: Performed by: ORTHOPAEDIC SURGERY

## 2021-06-15 PROCEDURE — 2709999900 HC NON-CHARGEABLE SUPPLY: Performed by: ORTHOPAEDIC SURGERY

## 2021-06-15 PROCEDURE — 77030007880 HC KT SPN EPDRL BBMI -B: Performed by: ANESTHESIOLOGY

## 2021-06-15 PROCEDURE — 74011000250 HC RX REV CODE- 250: Performed by: ORTHOPAEDIC SURGERY

## 2021-06-15 PROCEDURE — 77030002922 HC SUT FBRWRE ARTH -B: Performed by: ORTHOPAEDIC SURGERY

## 2021-06-15 PROCEDURE — 74011250636 HC RX REV CODE- 250/636: Performed by: NURSE ANESTHETIST, CERTIFIED REGISTERED

## 2021-06-15 PROCEDURE — 77030002966 HC SUT PDS J&J -A: Performed by: ORTHOPAEDIC SURGERY

## 2021-06-15 PROCEDURE — 97110 THERAPEUTIC EXERCISES: CPT

## 2021-06-15 PROCEDURE — 74011250637 HC RX REV CODE- 250/637: Performed by: ORTHOPAEDIC SURGERY

## 2021-06-15 PROCEDURE — 77030035236 HC SUT PDS STRATFX BARB J&J -B: Performed by: ORTHOPAEDIC SURGERY

## 2021-06-15 PROCEDURE — 86580 TB INTRADERMAL TEST: CPT | Performed by: ORTHOPAEDIC SURGERY

## 2021-06-15 PROCEDURE — 74011000250 HC RX REV CODE- 250: Performed by: ANESTHESIOLOGY

## 2021-06-15 PROCEDURE — 97161 PT EVAL LOW COMPLEX 20 MIN: CPT

## 2021-06-15 PROCEDURE — 74011000250 HC RX REV CODE- 250: Performed by: NURSE ANESTHETIST, CERTIFIED REGISTERED

## 2021-06-15 PROCEDURE — C1776 JOINT DEVICE (IMPLANTABLE): HCPCS | Performed by: ORTHOPAEDIC SURGERY

## 2021-06-15 PROCEDURE — 94760 N-INVAS EAR/PLS OXIMETRY 1: CPT

## 2021-06-15 PROCEDURE — 77030003665 HC NDL SPN BBMI -A: Performed by: ANESTHESIOLOGY

## 2021-06-15 PROCEDURE — 74011000250 HC RX REV CODE- 250

## 2021-06-15 PROCEDURE — 94762 N-INVAS EAR/PLS OXIMTRY CONT: CPT

## 2021-06-15 PROCEDURE — 65270000029 HC RM PRIVATE

## 2021-06-15 PROCEDURE — 82962 GLUCOSE BLOOD TEST: CPT

## 2021-06-15 PROCEDURE — 76060000035 HC ANESTHESIA 2 TO 2.5 HR: Performed by: ORTHOPAEDIC SURGERY

## 2021-06-15 PROCEDURE — 97535 SELF CARE MNGMENT TRAINING: CPT

## 2021-06-15 PROCEDURE — 76010000171 HC OR TIME 2 TO 2.5 HR INTENSV-TIER 1: Performed by: ORTHOPAEDIC SURGERY

## 2021-06-15 PROCEDURE — 72170 X-RAY EXAM OF PELVIS: CPT

## 2021-06-15 PROCEDURE — 86901 BLOOD TYPING SEROLOGIC RH(D): CPT

## 2021-06-15 PROCEDURE — 77030008462 HC STPLR SKN PROX J&J -A: Performed by: ORTHOPAEDIC SURGERY

## 2021-06-15 PROCEDURE — 74011250636 HC RX REV CODE- 250/636: Performed by: ORTHOPAEDIC SURGERY

## 2021-06-15 PROCEDURE — 77030040361 HC SLV COMPR DVT MDII -B

## 2021-06-15 PROCEDURE — 76210000016 HC OR PH I REC 1 TO 1.5 HR: Performed by: ORTHOPAEDIC SURGERY

## 2021-06-15 PROCEDURE — 77030006835 HC BLD SAW SAG STRY -B: Performed by: ORTHOPAEDIC SURGERY

## 2021-06-15 PROCEDURE — 77030031139 HC SUT VCRL2 J&J -A: Performed by: ORTHOPAEDIC SURGERY

## 2021-06-15 PROCEDURE — 77030029883 HC RETRV SUT ARTHSCP HOFFE BEAT -B: Performed by: ORTHOPAEDIC SURGERY

## 2021-06-15 PROCEDURE — 2709999900 HC NON-CHARGEABLE SUPPLY

## 2021-06-15 DEVICE — POLARCUP SHELL TI-PLASMA 51 NON-CEMENTED
Type: IMPLANTABLE DEVICE | Site: HIP | Status: FUNCTIONAL
Brand: POLARCUP

## 2021-06-15 DEVICE — COBALT CHROME 12/14 TAPER FEMORAL                                    HEAD 28MM + 0: Type: IMPLANTABLE DEVICE | Site: HIP | Status: FUNCTIONAL

## 2021-06-15 DEVICE — HIP H3 TOT ADV DUAL MOBILITY IMPL CAPPED H3 SN: Type: IMPLANTABLE DEVICE | Status: FUNCTIONAL

## 2021-06-15 DEVICE — POLARSTEM LATERAL NON-CEMENTED                                    WITH TI/HA 1
Type: IMPLANTABLE DEVICE | Site: HIP | Status: FUNCTIONAL
Brand: POLARSTEM

## 2021-06-15 RX ORDER — AMOXICILLIN 250 MG
2 CAPSULE ORAL DAILY
Status: DISCONTINUED | OUTPATIENT
Start: 2021-06-16 | End: 2021-06-17 | Stop reason: HOSPADM

## 2021-06-15 RX ORDER — BUPIVACAINE HYDROCHLORIDE 7.5 MG/ML
INJECTION, SOLUTION INTRASPINAL
Status: COMPLETED | OUTPATIENT
Start: 2021-06-15 | End: 2021-06-15

## 2021-06-15 RX ORDER — DEXTROSE 50 % IN WATER (D50W) INTRAVENOUS SYRINGE
Status: COMPLETED
Start: 2021-06-15 | End: 2021-06-15

## 2021-06-15 RX ORDER — SODIUM CHLORIDE, SODIUM LACTATE, POTASSIUM CHLORIDE, CALCIUM CHLORIDE 600; 310; 30; 20 MG/100ML; MG/100ML; MG/100ML; MG/100ML
50 INJECTION, SOLUTION INTRAVENOUS CONTINUOUS
Status: DISCONTINUED | OUTPATIENT
Start: 2021-06-15 | End: 2021-06-15 | Stop reason: HOSPADM

## 2021-06-15 RX ORDER — ONDANSETRON 4 MG/1
4 TABLET, ORALLY DISINTEGRATING ORAL
Status: DISCONTINUED | OUTPATIENT
Start: 2021-06-15 | End: 2021-06-17 | Stop reason: HOSPADM

## 2021-06-15 RX ORDER — CLONIDINE HYDROCHLORIDE 0.1 MG/1
0.1 TABLET ORAL 2 TIMES DAILY
COMMUNITY

## 2021-06-15 RX ORDER — ONDANSETRON 2 MG/ML
INJECTION INTRAMUSCULAR; INTRAVENOUS AS NEEDED
Status: DISCONTINUED | OUTPATIENT
Start: 2021-06-15 | End: 2021-06-15 | Stop reason: HOSPADM

## 2021-06-15 RX ORDER — LIDOCAINE HYDROCHLORIDE 10 MG/ML
0.1 INJECTION INFILTRATION; PERINEURAL AS NEEDED
Status: DISCONTINUED | OUTPATIENT
Start: 2021-06-15 | End: 2021-06-15 | Stop reason: HOSPADM

## 2021-06-15 RX ORDER — CEFAZOLIN SODIUM/WATER 2 G/20 ML
2 SYRINGE (ML) INTRAVENOUS EVERY 8 HOURS
Status: COMPLETED | OUTPATIENT
Start: 2021-06-15 | End: 2021-06-16

## 2021-06-15 RX ORDER — DIPHENHYDRAMINE HCL 25 MG
25 CAPSULE ORAL
Status: DISCONTINUED | OUTPATIENT
Start: 2021-06-15 | End: 2021-06-16

## 2021-06-15 RX ORDER — ASPIRIN 81 MG/1
81 TABLET ORAL EVERY 12 HOURS
Status: DISCONTINUED | OUTPATIENT
Start: 2021-06-15 | End: 2021-06-17 | Stop reason: HOSPADM

## 2021-06-15 RX ORDER — ZOLPIDEM TARTRATE 5 MG/1
5 TABLET ORAL
Status: DISCONTINUED | OUTPATIENT
Start: 2021-06-15 | End: 2021-06-17 | Stop reason: HOSPADM

## 2021-06-15 RX ORDER — LIDOCAINE HYDROCHLORIDE 20 MG/ML
INJECTION, SOLUTION EPIDURAL; INFILTRATION; INTRACAUDAL; PERINEURAL AS NEEDED
Status: DISCONTINUED | OUTPATIENT
Start: 2021-06-15 | End: 2021-06-15 | Stop reason: HOSPADM

## 2021-06-15 RX ORDER — SODIUM CHLORIDE, SODIUM LACTATE, POTASSIUM CHLORIDE, CALCIUM CHLORIDE 600; 310; 30; 20 MG/100ML; MG/100ML; MG/100ML; MG/100ML
75 INJECTION, SOLUTION INTRAVENOUS CONTINUOUS
Status: DISCONTINUED | OUTPATIENT
Start: 2021-06-15 | End: 2021-06-15 | Stop reason: HOSPADM

## 2021-06-15 RX ORDER — TRANEXAMIC ACID 100 MG/ML
INJECTION, SOLUTION INTRAVENOUS AS NEEDED
Status: DISCONTINUED | OUTPATIENT
Start: 2021-06-15 | End: 2021-06-15 | Stop reason: HOSPADM

## 2021-06-15 RX ORDER — MIDAZOLAM HYDROCHLORIDE 1 MG/ML
INJECTION, SOLUTION INTRAMUSCULAR; INTRAVENOUS AS NEEDED
Status: DISCONTINUED | OUTPATIENT
Start: 2021-06-15 | End: 2021-06-15 | Stop reason: HOSPADM

## 2021-06-15 RX ORDER — MIDAZOLAM HYDROCHLORIDE 1 MG/ML
2 INJECTION, SOLUTION INTRAMUSCULAR; INTRAVENOUS ONCE
Status: DISCONTINUED | OUTPATIENT
Start: 2021-06-15 | End: 2021-06-15 | Stop reason: HOSPADM

## 2021-06-15 RX ORDER — CLONIDINE HYDROCHLORIDE 0.1 MG/1
0.1 TABLET ORAL 2 TIMES DAILY
Status: DISCONTINUED | OUTPATIENT
Start: 2021-06-15 | End: 2021-06-17 | Stop reason: HOSPADM

## 2021-06-15 RX ORDER — MIDAZOLAM HYDROCHLORIDE 1 MG/ML
2 INJECTION, SOLUTION INTRAMUSCULAR; INTRAVENOUS
Status: COMPLETED | OUTPATIENT
Start: 2021-06-15 | End: 2021-06-15

## 2021-06-15 RX ORDER — ALBUTEROL SULFATE 0.83 MG/ML
2.5 SOLUTION RESPIRATORY (INHALATION) AS NEEDED
Status: DISCONTINUED | OUTPATIENT
Start: 2021-06-15 | End: 2021-06-15 | Stop reason: HOSPADM

## 2021-06-15 RX ORDER — HYDROMORPHONE HYDROCHLORIDE 2 MG/ML
0.5 INJECTION, SOLUTION INTRAMUSCULAR; INTRAVENOUS; SUBCUTANEOUS
Status: DISCONTINUED | OUTPATIENT
Start: 2021-06-15 | End: 2021-06-15 | Stop reason: HOSPADM

## 2021-06-15 RX ORDER — HYDROMORPHONE HYDROCHLORIDE 1 MG/ML
1 INJECTION, SOLUTION INTRAMUSCULAR; INTRAVENOUS; SUBCUTANEOUS
Status: DISCONTINUED | OUTPATIENT
Start: 2021-06-15 | End: 2021-06-17 | Stop reason: HOSPADM

## 2021-06-15 RX ORDER — SODIUM CHLORIDE 0.9 % (FLUSH) 0.9 %
5-40 SYRINGE (ML) INJECTION EVERY 8 HOURS
Status: DISCONTINUED | OUTPATIENT
Start: 2021-06-15 | End: 2021-06-17 | Stop reason: HOSPADM

## 2021-06-15 RX ORDER — FENTANYL CITRATE 50 UG/ML
100 INJECTION, SOLUTION INTRAMUSCULAR; INTRAVENOUS ONCE
Status: DISCONTINUED | OUTPATIENT
Start: 2021-06-15 | End: 2021-06-15 | Stop reason: HOSPADM

## 2021-06-15 RX ORDER — DEXTROSE 40 %
15 GEL (GRAM) ORAL AS NEEDED
Status: DISCONTINUED | OUTPATIENT
Start: 2021-06-15 | End: 2021-06-17 | Stop reason: HOSPADM

## 2021-06-15 RX ORDER — METFORMIN HYDROCHLORIDE 500 MG/1
1000 TABLET ORAL 2 TIMES DAILY WITH MEALS
Status: DISCONTINUED | OUTPATIENT
Start: 2021-06-15 | End: 2021-06-17 | Stop reason: HOSPADM

## 2021-06-15 RX ORDER — CEFAZOLIN SODIUM 1 G/3ML
INJECTION, POWDER, FOR SOLUTION INTRAMUSCULAR; INTRAVENOUS AS NEEDED
Status: DISCONTINUED | OUTPATIENT
Start: 2021-06-15 | End: 2021-06-15 | Stop reason: HOSPADM

## 2021-06-15 RX ORDER — DEXTROSE MONOHYDRATE 50 MG/ML
30 INJECTION, SOLUTION INTRAVENOUS CONTINUOUS
Status: DISCONTINUED | OUTPATIENT
Start: 2021-06-15 | End: 2021-06-17 | Stop reason: HOSPADM

## 2021-06-15 RX ORDER — SODIUM CHLORIDE 0.9 % (FLUSH) 0.9 %
5-40 SYRINGE (ML) INJECTION AS NEEDED
Status: DISCONTINUED | OUTPATIENT
Start: 2021-06-15 | End: 2021-06-17 | Stop reason: HOSPADM

## 2021-06-15 RX ORDER — CEFAZOLIN SODIUM/WATER 2 G/20 ML
2 SYRINGE (ML) INTRAVENOUS ONCE
Status: DISCONTINUED | OUTPATIENT
Start: 2021-06-15 | End: 2021-06-15 | Stop reason: HOSPADM

## 2021-06-15 RX ORDER — PROPOFOL 10 MG/ML
INJECTION, EMULSION INTRAVENOUS
Status: DISCONTINUED | OUTPATIENT
Start: 2021-06-15 | End: 2021-06-15 | Stop reason: HOSPADM

## 2021-06-15 RX ORDER — DEXTROSE 50 % IN WATER (D50W) INTRAVENOUS SYRINGE
25-50 AS NEEDED
Status: DISCONTINUED | OUTPATIENT
Start: 2021-06-15 | End: 2021-06-17 | Stop reason: HOSPADM

## 2021-06-15 RX ORDER — NALOXONE HYDROCHLORIDE 0.4 MG/ML
.2-.4 INJECTION, SOLUTION INTRAMUSCULAR; INTRAVENOUS; SUBCUTANEOUS
Status: DISCONTINUED | OUTPATIENT
Start: 2021-06-15 | End: 2021-06-17 | Stop reason: HOSPADM

## 2021-06-15 RX ORDER — FENTANYL CITRATE 50 UG/ML
INJECTION, SOLUTION INTRAMUSCULAR; INTRAVENOUS AS NEEDED
Status: DISCONTINUED | OUTPATIENT
Start: 2021-06-15 | End: 2021-06-15 | Stop reason: HOSPADM

## 2021-06-15 RX ORDER — SODIUM CHLORIDE 9 MG/ML
100 INJECTION, SOLUTION INTRAVENOUS CONTINUOUS
Status: DISPENSED | OUTPATIENT
Start: 2021-06-15 | End: 2021-06-17

## 2021-06-15 RX ORDER — GLIPIZIDE 5 MG/1
5 TABLET ORAL
Status: DISCONTINUED | OUTPATIENT
Start: 2021-06-16 | End: 2021-06-17 | Stop reason: HOSPADM

## 2021-06-15 RX ORDER — HYDROCODONE BITARTRATE AND ACETAMINOPHEN 10; 325 MG/1; MG/1
1 TABLET ORAL
Status: DISCONTINUED | OUTPATIENT
Start: 2021-06-15 | End: 2021-06-17 | Stop reason: HOSPADM

## 2021-06-15 RX ORDER — ACETAMINOPHEN 500 MG
1000 TABLET ORAL ONCE
Status: DISCONTINUED | OUTPATIENT
Start: 2021-06-15 | End: 2021-06-15 | Stop reason: HOSPADM

## 2021-06-15 RX ORDER — PIOGLITAZONEHYDROCHLORIDE 15 MG/1
15 TABLET ORAL
Status: DISCONTINUED | OUTPATIENT
Start: 2021-06-16 | End: 2021-06-17 | Stop reason: HOSPADM

## 2021-06-15 RX ORDER — QUETIAPINE FUMARATE 100 MG/1
400 TABLET, FILM COATED ORAL
Status: DISCONTINUED | OUTPATIENT
Start: 2021-06-15 | End: 2021-06-16

## 2021-06-15 RX ORDER — LOSARTAN POTASSIUM 50 MG/1
100 TABLET ORAL DAILY
Status: DISCONTINUED | OUTPATIENT
Start: 2021-06-16 | End: 2021-06-17 | Stop reason: HOSPADM

## 2021-06-15 RX ORDER — DEXTROSE 50 % IN WATER (D50W) INTRAVENOUS SYRINGE
12 ONCE
Status: COMPLETED | OUTPATIENT
Start: 2021-06-15 | End: 2021-06-15

## 2021-06-15 RX ADMIN — DEXTROSE MONOHYDRATE 25 G: 25 INJECTION, SOLUTION INTRAVENOUS at 17:21

## 2021-06-15 RX ADMIN — FENTANYL CITRATE 25 MCG: 50 INJECTION INTRAMUSCULAR; INTRAVENOUS at 09:39

## 2021-06-15 RX ADMIN — Medication 3 AMPULE: at 07:40

## 2021-06-15 RX ADMIN — MIDAZOLAM 2 MG: 1 INJECTION INTRAMUSCULAR; INTRAVENOUS at 08:34

## 2021-06-15 RX ADMIN — DEXTROSE MONOHYDRATE 6 G: 25 INJECTION, SOLUTION INTRAVENOUS at 08:21

## 2021-06-15 RX ADMIN — CLONIDINE HYDROCHLORIDE 0.1 MG: 0.1 TABLET ORAL at 20:43

## 2021-06-15 RX ADMIN — HYDROCODONE BITARTRATE AND ACETAMINOPHEN 1 TABLET: 10; 325 TABLET ORAL at 13:32

## 2021-06-15 RX ADMIN — PHENYLEPHRINE HYDROCHLORIDE 100 MCG: 10 INJECTION INTRAVENOUS at 10:00

## 2021-06-15 RX ADMIN — CEFAZOLIN SODIUM 2 G: 100 INJECTION, POWDER, LYOPHILIZED, FOR SOLUTION INTRAVENOUS at 17:19

## 2021-06-15 RX ADMIN — FENTANYL CITRATE 50 MCG: 50 INJECTION INTRAMUSCULAR; INTRAVENOUS at 09:27

## 2021-06-15 RX ADMIN — DEXTROSE MONOHYDRATE 25 G: 25 INJECTION, SOLUTION INTRAVENOUS at 14:39

## 2021-06-15 RX ADMIN — MIDAZOLAM 1 MG: 1 INJECTION INTRAMUSCULAR; INTRAVENOUS at 09:46

## 2021-06-15 RX ADMIN — SODIUM CHLORIDE, SODIUM LACTATE, POTASSIUM CHLORIDE, AND CALCIUM CHLORIDE: 600; 310; 30; 20 INJECTION, SOLUTION INTRAVENOUS at 10:24

## 2021-06-15 RX ADMIN — Medication 81 MG: at 20:43

## 2021-06-15 RX ADMIN — DEXTROSE MONOHYDRATE 25 G: 25 INJECTION, SOLUTION INTRAVENOUS at 12:23

## 2021-06-15 RX ADMIN — MIDAZOLAM 1 MG: 1 INJECTION INTRAMUSCULAR; INTRAVENOUS at 09:36

## 2021-06-15 RX ADMIN — PROPOFOL 60 MG: 10 INJECTION, EMULSION INTRAVENOUS at 09:45

## 2021-06-15 RX ADMIN — TRANEXAMIC ACID 2 G: 100 INJECTION, SOLUTION INTRAVENOUS at 10:05

## 2021-06-15 RX ADMIN — Medication 10 ML: at 21:39

## 2021-06-15 RX ADMIN — DIPHENHYDRAMINE HCL 25 MG: 25 CAPSULE ORAL at 21:45

## 2021-06-15 RX ADMIN — PHENYLEPHRINE HYDROCHLORIDE 100 MCG: 10 INJECTION INTRAVENOUS at 10:20

## 2021-06-15 RX ADMIN — ZOLPIDEM TARTRATE 5 MG: 5 TABLET ORAL at 23:04

## 2021-06-15 RX ADMIN — FENTANYL CITRATE 25 MCG: 50 INJECTION INTRAMUSCULAR; INTRAVENOUS at 11:00

## 2021-06-15 RX ADMIN — BUPIVACAINE HYDROCHLORIDE IN DEXTROSE 15 MG: 7.5 INJECTION, SOLUTION SUBARACHNOID at 09:45

## 2021-06-15 RX ADMIN — ONDANSETRON 4 MG: 4 TABLET, ORALLY DISINTEGRATING ORAL at 20:43

## 2021-06-15 RX ADMIN — PHENYLEPHRINE HYDROCHLORIDE 100 MCG: 10 INJECTION INTRAVENOUS at 10:12

## 2021-06-15 RX ADMIN — TRANEXAMIC ACID 1 G: 100 INJECTION, SOLUTION INTRAVENOUS at 11:13

## 2021-06-15 RX ADMIN — PROPOFOL 75 MCG/KG/MIN: 10 INJECTION, EMULSION INTRAVENOUS at 09:50

## 2021-06-15 RX ADMIN — LIDOCAINE HYDROCHLORIDE 60 MG: 20 INJECTION, SOLUTION EPIDURAL; INFILTRATION; INTRACAUDAL; PERINEURAL at 09:45

## 2021-06-15 RX ADMIN — SODIUM CHLORIDE 20 MCG/MIN: 900 INJECTION, SOLUTION INTRAVENOUS at 10:22

## 2021-06-15 RX ADMIN — TUBERCULIN PURIFIED PROTEIN DERIVATIVE 5 UNITS: 5 INJECTION, SOLUTION INTRADERMAL at 08:14

## 2021-06-15 RX ADMIN — Medication 1 AMPULE: at 20:44

## 2021-06-15 RX ADMIN — ONDANSETRON 4 MG: 2 INJECTION INTRAMUSCULAR; INTRAVENOUS at 11:13

## 2021-06-15 RX ADMIN — HYDROCODONE BITARTRATE AND ACETAMINOPHEN 1 TABLET: 10; 325 TABLET ORAL at 17:06

## 2021-06-15 RX ADMIN — HYDROMORPHONE HYDROCHLORIDE 1 MG: 1 INJECTION, SOLUTION INTRAMUSCULAR; INTRAVENOUS; SUBCUTANEOUS at 16:03

## 2021-06-15 RX ADMIN — HYDROCODONE BITARTRATE AND ACETAMINOPHEN 1 TABLET: 10; 325 TABLET ORAL at 23:01

## 2021-06-15 RX ADMIN — DEXTROSE MONOHYDRATE 30 ML/HR: 5 INJECTION, SOLUTION INTRAVENOUS at 08:51

## 2021-06-15 RX ADMIN — CEFAZOLIN 2 G: 1 INJECTION, POWDER, FOR SOLUTION INTRAMUSCULAR; INTRAVENOUS; PARENTERAL at 09:45

## 2021-06-15 RX ADMIN — SODIUM CHLORIDE, SODIUM LACTATE, POTASSIUM CHLORIDE, AND CALCIUM CHLORIDE 75 ML/HR: 600; 310; 30; 20 INJECTION, SOLUTION INTRAVENOUS at 07:51

## 2021-06-15 RX ADMIN — HYDROMORPHONE HYDROCHLORIDE 1 MG: 1 INJECTION, SOLUTION INTRAMUSCULAR; INTRAVENOUS; SUBCUTANEOUS at 20:43

## 2021-06-15 NOTE — H&P
History and Physical Updated with no interval change.  Chelsey Moya MD H&P Update: No change since previous exam.    Erika Santos M.D.  6/15/2021

## 2021-06-15 NOTE — PROGRESS NOTES
Problem: Self Care Deficits Care Plan (Adult)  Goal: *Acute Goals and Plan of Care (Insert Text)  Outcome: Progressing Towards Goal  Note: GOALS:   DISCHARGE GOALS (in preparation for going home/rehab):  3 days  1. Ms. Lulu Ortiz will perform one lower body dressing activity with minimal assistance with adaptive equipment to demonstrate improved functional mobility and safety. 2.  Ms. Lulu Ortiz will perform one lower body bathing activity with minimal  assistance with adaptive equipment to demonstrate improved functional mobility and safety. 3.  Ms. Lulu Ortiz will perform toileting/toilet transfer with contact guard assistance with adaptive equipment to demonstrate improved functional mobility and safety. 4.  Ms. Lulu Ortiz will perform shower transfer with contact guard assistance with adaptive equipment to demonstrate improved functional mobility and safety. 5.  Ms. Lulu Ortiz will state RICKY precautions with two verbal cues to demonstrate improved functional mobility and safety. [facility] OCCUPATIONAL THERAPY RICKY: Initial Assessment, Daily Note, Treatment Day: Day of Assessment, and PM 6/15/2021  INPATIENT: Hospital Day: 1  Payor: Lyubov Soares / Plan: [address] [place] [address] HMO / Product Type: Managed Care Medicare /      NAME/AGE/GENDER: Kaleb Foster is a 54 y.o. female   PRIMARY DIAGNOSIS:  Primary osteoarthritis of right hip [M16.11]   Procedure(s) and Anesthesia Type:     * HIP ARTHROPLASTY TOTAL/ RIGHT - Spinal (Right)  ICD-10: Treatment Diagnosis:    Pain in Right Hip (M25.551)  Stiffness of Right Hip, Not elsewhere classified (M25.651)      ASSESSMENT:     Ms. Lulu Ortiz is s/p right RICKY and presents with decreased weight bearing on right LE and decreased independence with functional mobility and activities of daily living as compared to prior level of function and safety.   Patient would benefit from skilled Occupational Therapy to maximize independence and safety with self-care task and functional mobility. Pt would also benefit from education on lower body adaptive equipment and hip precautions post-surgery in preparation for going home. Patient plans for further rehab at home with home health services and good family support , daughters. OT reviewed therapy schedule and plan of care with patient. Patient was able to transfer and perform self care skills as charted below. Patient instructed to call for assistance when needing to get up from the recliner and all needs in reach. Patient verbalized understanding of call light. This section established at most recent assessment   PROBLEM LIST (Impairments causing functional limitations):  Decreased Strength  Decreased ADL/Functional Activities  Decreased Transfer Abilities  Increased Pain  Increased Fatigue  Decreased Flexibility/Joint Mobility  Decreased Knowledge of Precautions   INTERVENTIONS PLANNED: (Benefits and precautions of occupational therapy have been discussed with the patient.)  Activities of daily living training  Adaptive equipment training  Balance training  Clothing management  Donning&doffing training  Theraputic activity     TREATMENT PLAN: Frequency/Duration: Follow patient 1-2 times to address above goals. Rehabilitation Potential For Stated Goals: Good     RECOMMENDED REHABILITATION/EQUIPMENT: (at time of discharge pending progress): Continue Skilled Therapy. OCCUPATIONAL PROFILE AND HISTORY:   History of Present Injury/Illness (Reason for Referral): Pt presents this date s/p (right) RICKY. Past Medical History/Comorbidities:   Ms. Simon Shanks  has a past medical history of Anxiety, Asthma, Chronic pain, DDD (degenerative disc disease), Depression, Diabetes mellitus ([facility] [address] Fibromyalgia, History of COVID-19 (05/10/2021), HTN (hypertension), and Hypercholesteremia.   Ms. Simon Shanks  has a past surgical history that includes hx total abdominal hysterectomy; hx knee replacement (Left); hx carpal tunnel release; hx other surgical; hx cyst removal; and hx cholecystectomy. Social History/Living Environment:   No data recorded  Prior Level of Function/Work/Activity:  Pt was (I) with ADL's and ambulated short distances. Number of Personal Factors/Comorbidities that affect the Plan of Care: Brief history (0):  LOW COMPLEXITY   ASSESSMENT OF OCCUPATIONAL PERFORMANCE[de-identified]   Most Recent Physical Functioning:   Balance  Sitting: (P) Intact  Standing: (P) Pull to stand; With support       Gross Assessment: (P) Yes  Gross Assessment  AROM: (P) Generally decreased, functional (left LE)  Strength: (P) Generally decreased, functional (left LE)            Coordination  Fine Motor Skills-Upper: Left Intact; Right Intact  Gross Motor Skills-Upper: Left Intact; Right Intact         Mental Status  Neurologic State: Alert  Orientation Level: Oriented X4  Cognition: Appropriate decision making  Perception: Appears intact  Perseveration: No perseveration noted  Safety/Judgement: Awareness of environment                Basic ADLs (From Assessment) Complex ADLs (From Assessment)   Basic ADL  Feeding: Independent  Oral Facial Hygiene/Grooming: Supervision  Bathing: Moderate assistance  Upper Body Dressing: Supervision  Lower Body Dressing: Moderate assistance  Toileting: Moderate assistance     Grooming/Bathing/Dressing Activities of Daily Living     Cognitive Retraining  Safety/Judgement: Awareness of environment                 Functional Transfers  Bathroom Mobility: Minimum assistance  Toilet Transfer : Minimum assistance  Shower Transfer: Minimum assistance     Bed/Mat Mobility  Supine to Sit: (P) Moderate assistance  Sit to Stand: (P) Minimum assistance  Stand to Sit: (P) Minimum assistance  Bed to Chair: (P) Minimum assistance  Scooting: (P) Additional time; Moderate assistance         Physical Skills Involved:  Range of Motion  Balance  Strength Cognitive Skills Affected (resulting in the inability to perform in a timely and safe manner):  none Psychosocial Skills Affected:  Environmental Adaptation   Number of elements that affect the Plan of Care: 3-5:  MODERATE COMPLEXITY   CLINICAL DECISION MAKING:   [employer] AM-PAC 6 Clicks   Daily Activity Inpatient Short Form  How much help from another person does the patient currently need. .. Total A Lot A Little None   1. Putting on and taking off regular lower body clothing? [] 1   [x] 2   [] 3   [] 4   2. Bathing (including washing, rinsing, drying)? [] 1   [x] 2   [] 3   [] 4   3. Toileting, which includes using toilet, bedpan or urinal?   [] 1   [] 2   [x] 3   [] 4   4. Putting on and taking off regular upper body clothing? [] 1   [] 2   [] 3   [x] 4   5. Taking care of personal grooming such as brushing teeth? [] 1   [] 2   [] 3   [x] 4   6. Eating meals? [] 1   [] 2   [] 3   [x] 4   © 2007, Trustees of [facility] [employer], under license to [employer]. All rights reserved     Score:  Initial: 19 Most Recent: X (Date: -- )    Interpretation of Tool:  Represents activities that are increasingly more difficult (i.e. Bed mobility, Transfers, Gait). Use of outcome tool(s) and clinical judgement create a POC that gives a: LOW COMPLEXITY            TREATMENT:   (In addition to Assessment/Re-Assessment sessions the following treatments were rendered)     Pre-treatment Symptoms/Complaints:  pt up in room without complaint of pain  Pain: Initial:      Post Session:  0     Self Care: (10 min): Procedure(s) (per grid) utilized to improve and/or restore self-care/home management as related to toileting, grooming, and functional transfers . Required minimal verbal and   cueing to facilitate activities of daily living skills and compensatory activities. OT evaluations completed     Treatment/Session Assessment:     Response to Treatment:  pt up in room tolerated well.     Education:  [] Home Exercises  [x] Fall Precautions  [x] Hip Precautions [] Going Home Video  [] Knee/Hip Prosthesis Review  [x] Walker Management/Safety [x] Adaptive Equipment as Needed       Interdisciplinary Collaboration:   Physical Therapist  Occupational Therapist  Registered Nurse    After treatment position/precautions:   Up in chair  Bed/Chair-wheels locked  Call light within reach  RN notified  Family at bedside     Compliance with Program/Exercises: Compliant all of the time, Will assess as treatment progresses. Recommendations/Intent for next treatment session:  Treatment next visit will focus on increasing Ms. Waller's independence with bed mobility, transfers, self care, functional mobility, modalities for pain, and patient education.       Total Treatment Duration:  OT Patient Time In/Time Out  Time In: 1435  Time Out: [address], OT

## 2021-06-15 NOTE — PERIOP NOTES
TRANSFER - OUT REPORT:    Verbal report given to Deanna Quesada on Luis Fernando Sheets  being transferred to [facility] for routine post - op       Report consisted of patients Situation, Background, Assessment and   Recommendations(SBAR). Information from the following report(s) SBAR, OR Summary, MAR and Cardiac Rhythm NSR was reviewed with the receiving nurse. Lines:   Peripheral IV 06/15/21 Anterior; Left Hand (Active)   Site Assessment Clean, dry, & intact 06/15/21 1235   Phlebitis Assessment 0 06/15/21 1235   Infiltration Assessment 0 06/15/21 1235   Dressing Status Clean, dry, & intact 06/15/21 1235   Dressing Type Transparent;Tape 06/15/21 1235   Hub Color/Line Status Green; Infusing;Patent 06/15/21 1235        Opportunity for questions and clarification was provided. Patient transported with:   Tech    VTE prophylaxis orders have been written for Luis Fernando Sheets. Patient and family given floor number and nurses name. Family updated re: pt status after security code verified.

## 2021-06-15 NOTE — ANESTHESIA PREPROCEDURE EVALUATION
Relevant Problems   No relevant active problems       Anesthetic History   No history of anesthetic complications            Review of Systems / Medical History  Patient summary reviewed, nursing notes reviewed and pertinent labs reviewed    Pulmonary            Asthma : well controlled    Comments: Asymptomatic Covid May 10, 2021   Neuro/Psych         Psychiatric history     Cardiovascular    Hypertension: well controlled          Hyperlipidemia    Exercise tolerance[de-identified] Limited by obesity  Comments: TTE- EF 60%, LVH   GI/Hepatic/Renal  Within defined limits              Endo/Other    Diabetes: type 2    Morbid obesity and arthritis     Other Findings            Physical Exam    Airway        Mouth opening: Normal     Cardiovascular  Regular rate and rhythm,  S1 and S2 normal,  no murmur, click, rub, or gallop             Dental         Pulmonary  Breath sounds clear to auscultation               Abdominal         Other Findings            Anesthetic Plan    ASA: 3  Anesthesia type: spinal and general - backup          Induction: Intravenous  Anesthetic plan and risks discussed with: Patient, Family and Son / Daughter

## 2021-06-15 NOTE — PERIOP NOTES
Patient BS was 63. Dr. Everett Mcneal notified, order received to push D50W 12 ml and start a continuous D5 infusion at 30 ml\hr.

## 2021-06-15 NOTE — PROGRESS NOTES
06/15/21 1622   Oxygen Therapy   O2 Sat (%) 99 %   Pulse via Oximetry 73 beats per minute   O2 Device None (Room air)   Patient encouraged to do IS every hour while awake-patient agreed. No shortness of breath or distress noted. BS are clear b/l. [facility] notes reviewed- continuous sat # ordered HS.

## 2021-06-15 NOTE — PERIOP NOTES
Patient BS 60 at recheck. Dr. Zuly Santacruz notified, order received to push another D50W 12 ml and increase D5 infusion to 50 ml/hr.

## 2021-06-15 NOTE — CONSULTS
[facility]ist Consult Note    Patient: Faith Moya Date: 6/15/2021  female, 54 y.o. Admit Date: 6/15/2021  Attending: Hema Hyman MD     ASSESSMENT AND PLAN:     End-stage right hip osteoarthritis status post right total hip arthroplasty with Dr. Marina Medrano on [address] 15  No documented complications, and aspirin 81 mg twice daily for DVT prophylaxis, continue pain control, follow physical therapy occupational therapy evaluation    History of diabetes type 2on Metformin and glipizide Actos resumed, renal functions reassuring, consider additional management glucose greater than 180    History of bipolar 1resume home high-dose Seroquel nightly, clinically likely stable, monitor    History hypertensions on losartan and clonidine resume prior although primary, continue monitor adjust as needed    Morbid obesity BMI of 42 due to excess calorieslifestyle education regarding diet    REASON FOR CONSULT:  I was asked to consult on this patient at the kind request of Janie San MD, for medical management. HISTORY OF PRESENT ILLNESS:      Patient Active Problem List   Diagnosis Code    Osteoarthritis of right hip M16.11       Faith Moya is a 54 y.o. female, who  has a past medical history of Anxiety, Asthma, Chronic pain, DDD (degenerative disc disease), Depression, Diabetes mellitus ([facility] [address] Fibromyalgia, History of COVID-19 (05/10/2021), HTN (hypertension), and Hypercholesteremia.,  has a past surgical history that includes hx total abdominal hysterectomy; hx knee replacement (Left); hx carpal tunnel release; hx other surgical; hx cyst removal; and hx cholecystectomy. Who presents with scheduled right hip arthroplasty for end-stage right hip osteoarthritis    Allergy  Allergies   Allergen Reactions    Tylox [Oxycodone-Acetaminophen] Itching    Wellbutrin [Bupropion Hcl] Nausea Only       Medication list  Prior to Admission Medications   Prescriptions Last Dose Informant Patient Reported? Taking? HYDROcodone-acetaminophen (NORCO) 7.5-325 mg per tablet 2021 at Unknown time  Yes Yes   Sig: Take 1 Tablet by mouth every six (6) hours as needed for Pain. Take / use AM day of surgery  per anesthesia protocols prn   MULTIVITAMIN PO 2021 at Unknown time  Yes Yes   Sig: Take  by mouth daily. PIOGLITAZONE HCL (ACTOS PO) 6/15/2021 at Unknown time  Yes Yes   Sig: Take 15 mg by mouth daily. QUEtiapine (SEROquel) 400 mg tablet 2021 at Unknown time  Yes Yes   Sig: Take 400 mg by mouth nightly. acetaminophen/diphenhydramine (TYLENOL PM EXTRA STRENGTH PO) 6/15/2021 at Unknown time  Yes Yes   Sig: Take  by mouth nightly. aspirin delayed-release 81 mg tablet 2021 at Unknown time  Yes Yes   Sig: Take 81 mg by mouth daily. biotin 5,000 mcg TbDi 2021 at Unknown time  Yes Yes   Sig: Take  by mouth daily. cholecalciferol, vitamin D3, (VITAMIN D3 PO) 2021 at Unknown time  Yes Yes   Sig: Take  by mouth daily. cloNIDine HCL (CATAPRES) 0.1 mg tablet 6/15/2021 at Unknown time  Yes Yes   Sig: Take 0.1 mg by mouth two (2) times a day. cyanocobalamin (VITAMIN B-12) 1,000 mcg/mL injection 2021 at Unknown time  Yes Yes   Si,000 mcg by IntraMUSCular route every thirty (30) days. diclofenac potassium (CATAFLAM) 50 mg tablet 2021 at Unknown time  Yes Yes   Sig: Take 50 mg by mouth three (3) times daily as needed. exenatide microspheres (Bydureon) 2 mg serr 2021 at Unknown time  Yes Yes   Si mg by SubCUTAneous route every seven (7) days. Indications: type 2 diabetes mellitus   glimepiride (AMARYL) 2 mg tablet 6/15/2021 at Unknown time  Yes Yes   Sig: Take 2 mg by mouth every morning. losartan (COZAAR) 50 mg tablet 6/15/2021 at Unknown time  Yes Yes   Sig: Take 100 mg by mouth daily. melatonin 5 mg tablet   Yes No   Sig: Take 10 mg by mouth nightly.    metFORMIN (GLUCOPHAGE) 1,000 mg tablet 6/15/2021 at Unknown time  Yes Yes   Sig: Take 1,000 mg by mouth two (2) times daily (with meals). zolpidem (AMBIEN) 10 mg tablet 6/14/2021 at Unknown time  Yes Yes   Sig: Take 5 mg by mouth nightly as needed. Facility-Administered Medications: None         Past Medical History  Past Medical History:   Diagnosis Date    Anxiety     Asthma     mild per pt    Chronic pain     DDD (degenerative disc disease)     Depression     Diabetes mellitus (HCC)     type 2, oral and injectable, no home checks, last Hgba1c 7.0 per pt    Fibromyalgia     History of COVID-19 05/10/2021    asymptomatic    HTN (hypertension)     Hypercholesteremia        Past Surgical History:   Procedure Laterality Date    HX CARPAL TUNNEL RELEASE      bilateral    HX CHOLECYSTECTOMY      HX CYST REMOVAL      to L axilla    HX KNEE REPLACEMENT Left     HX OTHER SURGICAL      scar revision to abd    HX TOTAL ABDOMINAL HYSTERECTOMY         Social History  Social History     Socioeconomic History    Marital status:      Spouse name: Not on file    Number of children: Not on file    Years of education: Not on file    Highest education level: Not on file   Tobacco Use    Smoking status: Never Smoker    Smokeless tobacco: Never Used   Substance and Sexual Activity    Alcohol use: Yes     Comment: rare    Drug use: No     Social Determinants of Health     Financial Resource Strain:     Difficulty of Paying Living Expenses:    Food Insecurity:     Worried About Running Out of Food in the Last Year:     Ran Out of Food in the Last Year:    Transportation Needs:     Lack of Transportation (Medical):      Lack of Transportation (Non-Medical):    Physical Activity:     Days of Exercise per Week:     Minutes of Exercise per Session:    Stress:     Feeling of Stress :    Social Connections:     Frequency of Communication with Friends and Family:     Frequency of Social Gatherings with Friends and Family:     Attends Taoist Services:     Active Member of Clubs or Organizations:     Attends Club or Organization Meetings:     Marital Status:        Family History:    History reviewed. No pertinent family history. REVIEW OF SYSTEMS:   A 14 point review of systems was taken and pertinent positive as per HPI.     PHYSICAL EXAMINATION:  Vital 24 Hour Range Most Recent Value  Temperature Temp  Min: 97.2 °F (36.2 °C)  Max: 98.3 °F (36.8 °C) 98.3 °F (36.8 °C)    Pulse Pulse  Min: 56  Max: 84 84  Respiratory Resp  Min: 16  Max: 16 16  Blood Pressure BP  Min: 102/70  Max: 151/71 135/88  Pulse Oximetry SpO2  Min: 92 %  Max: 99 % 92 %  O2 No data recorded      Vital Most Recent Value First Value  Weight 111.9 kg (246 lb 11.2 oz) Weight: 111.9 kg (246 lb 11.2 oz)  Height 5' 4\" (162.6 cm) Height: 5' 4\" (162.6 cm)  BMI   N/A    Physical Exam:   General: No acute distress, speaking in full sentences, no use of accessory muscles   HEENT: Pupils equal and reactive to light and accommodation, oropharynx is clear   Neck: Supple, no lymphadenopathy, no JVD   Lungs: Clear to auscultation bilaterally   Cardiovascular: Regular rate and rhythm with normal S1 and S2   Abdomen: Soft, nontender, nondistended, normoactive bowel sounds   Extremities: Right lower extremity surgical dressing dry, intact clinically neurovascularly intact   neuro: Nonfocal, A&O x3   Psych: Normal affect     Intake/Output last 3 shifts:  Date 06/14/21 0700 - 06/15/21 0659(Not Admitted) 06/15/21 0700 - 06/16/21 0659   Shift 2826-2814 3976-0025 24 Hour Total 7993-4504 5810-7943 24 Hour Total   INTAKE   I.V.    1500  1500     Volume (lactated Ringers infusion)    1500  1500   Shift Total(mL/kg)    1500(13.4)  1500(13.4)   OUTPUT   Urine    0  0     Urine Output    0  0   Blood    250  250     Estimated Blood Loss    250  250   Shift Total(mL/kg)    250(2.2)  250(2.2)   NET    1250  1250   Weight (kg)    111.9 111.9 111.9       Labs:  magnesium:7,phos:7)CMP: No results found for: NA, K, CL, CO2, AGAP, GLU, BUN, CREA, GFRAA, GFRNA, CA, MG, PHOS, ALB, TBIL, TBILI, TP, ALB, GLOB, AGRAT, ALT      CBC:  No results found for: WBC, HGB, HCT, PLT, HGBEXT, HCTEXT, PLTEXT    No results found for: INR, PTMR, PTP, PT1, PT2, INREXT    ABG:  No results found for: PH, PHI, PCO2, PCO2I, PO2, PO2I, HCO3, HCO3I, FIO2, FIO2I        No results found for: CPK, RCK1, RCK2, RCK3, RCK4, CKMB, CKNDX, CKND1, TROPT, TROIQ, BNPP, BNP    Imagining & Other Studies    XR Results (maximum last 3): Results from [place] encounter on 06/15/21    XR PELV AP ONLY    Narrative  Exam: XR PELV AP ONLY on 6/15/2021 12:15 PM    Clinical History: The Female patient is 54years old  presenting for Post op  total hip. Comparison: None    Findings:    Single AP view of the pelvis demonstrates an intact pelvic ring. Bipolar right hip prosthesis with anatomic alignment and positioning  demonstrated. .    No gross soft tissue defect or opaque foreign body is seen. Impression  1. Right hip prosthesis placement. Results from Hospital Encounter encounter on 09/10/20    XR INJ ASP HIP RT    Narrative  History: Right hip pain    Exam: Right hip steroid injection    Technique: Informed written consent is obtained prior to initiation of the  procedure. The patient is draped and sterilized in the usual fashion. Approximately 3 cc of lidocaine is utilized to achieve local anesthesia. Under  fluoroscopic guidance, a needle is inserted into the right hip, and a solution  comprised of 4 cc Marcaine and 40 mg Depo-Medrol is administered. No immediate  postprocedure complications. Less than 2 min. fluoroscopy time utilized for this  procedure. 1 number of images were obtained. Findings: Osteoarthritic change present within the right hip. Contrast is seen  within the right hip articulation prior to injection of steroid solution. Impression  Impression: Status post steroid injection within the right hip.       Results from [place] encounter on 07/07/20    XR KNEE RT 3 V    Narrative  EXAM:  XR KNEE RT 3 V    INDICATION:   R knee pain    COMPARISON: None. FINDINGS: Three views of the right knee demonstrate degenerative changes of the  medial compartment. No evidence of fractures, dislocations or joint effusions. .    Impression  IMPRESSION: Mild secondary osteoarthritis of the medial compartment. No acute  abnormalities. CT Results (maximum last 3): No results found for this or any previous visit. MRI Results (maximum last 3):      Nuclear Medicine Results (maximum last 3): No results found for this or any previous visit. US Results (maximum last 3): No results found for this or any previous visit. DEXA Results (maximum last 3): No results found for this or any previous visit. JEFFREY Results (maximum last 3): No results found for this or any previous visit. IR Results (maximum last 3): No results found for this or any previous visit. VAS/US Results (maximum last 3): No results found for this or any previous visit. PET Results (maximum last 3): No results found for this or any previous visit. EKG Results     None            Thank you Winnie Garibay MD for allowing us to participate in the care of this interesting patient. We shall follow with you.     Leanne Baird DO  6/15/2021 1:40 PM

## 2021-06-15 NOTE — PERIOP NOTES
BGL 70. Dr. Bibiana Capps notified. MD jorge with patient being transferred to floor. Dr. Bibiana Capps would like BGl checked once an hour for the next 12 hours. Orders placed.

## 2021-06-15 NOTE — ANESTHESIA POSTPROCEDURE EVALUATION
Procedure(s):  HIP ARTHROPLASTY TOTAL/ RIGHT. spinal, general - backup    Anesthesia Post Evaluation      Multimodal analgesia: multimodal analgesia used between 6 hours prior to anesthesia start to PACU discharge  Patient location during evaluation: PACU  Patient participation: complete - patient participated  Level of consciousness: responsive to verbal stimuli  Pain management: adequate  Airway patency: patent  Anesthetic complications: no  Cardiovascular status: acceptable  Respiratory status: acceptable, spontaneous ventilation and nonlabored ventilation  Hydration status: acceptable  Comments: Glucose finally acceptable after multiple interventions.   Will need continued close blood glucose monitoring  Post anesthesia nausea and vomiting:  none      INITIAL Post-op Vital signs:   Vitals Value Taken Time   /80 06/15/21 1245   Temp 36.8 °C (98.3 °F) 06/15/21 1146   Pulse 58 06/15/21 1245   Resp 16 06/15/21 1245   SpO2 95 % 06/15/21 1245

## 2021-06-15 NOTE — BRIEF OP NOTE
Brief Postoperative Note    Patient: Ron Gómez  YOB: 1965  MRN: [id number]    Date of Procedure: 6/15/2021     Pre-Op Diagnosis: Primary osteoarthritis of right hip [M16.11]    Post-Op Diagnosis: Same as preoperative diagnosis. Procedure(s):  HIP ARTHROPLASTY TOTAL/ RIGHT    Surgeon(s):  Capri Weaver MD    Surgical Assistant: None    Anesthesia: Spinal     Estimated Blood Loss (mL): 924     Complications: None    Specimens: * No specimens in log *     Implants:   Implant Name Type Inv.  Item Serial No.  Lot No. LRB No. Used Action   SHELL ACET SZ 51MM TI PLSM HIP CEMENTLESS POLARCUP - [id number]  SHELL ACET SZ 51MM TI PLSM HIP CEMENTLESS [facility]_ [id number] Right 1 Implanted   POLARCUP INSERT     [id number] Right 1 Implanted   HEAD FEM [id number] +0MM OFFSET 12/14 [employer] CH[employer] VICKI REV - [id number]  HEAD FEM [id number] +0MM OFFSET 12/14 [employer]ICK[employer] REV  [facility]_ [id number] Right 1 Implanted   STEM FEM SZ 1 CCD 126DEG LAT 12/14 TAPR TI HA CEMENTLESS - [id number]  STEM FEM SZ 1 CCD 126DEG LAT 12/14 TAPR TI HA CEMENTLESS  [facility]_ [id number] Right 1 Implanted       Drains: * No LDAs found *    Findings: severe DJD    Electronically Signed by Denny Baker MD on 6/15/2021 at 11:08 AM

## 2021-06-15 NOTE — PROGRESS NOTES
Problem: Mobility Impaired (Adult and Pediatric)  Goal: *Acute Goals and Plan of Care (Insert Text)  Outcome: Progressing Towards Goal  Note: GOALS (1-4 days):  (1.)Ms. Waller will move from supine to sit and sit to supine  in bed with STAND BY ASSIST.    (2.)Ms. Waller will transfer from bed to chair and chair to bed with STAND BY ASSIST using the least restrictive device. (3.)Ms. Waller will ambulate with STAND BY ASSIST for 100 feet with the least restrictive device. (4.)Ms. Waller will ambulate up/down 2 steps with bilateral  railing with MINIMAL ASSIST with no device. (5.)Ms. Waller will state/observe ABBY precautions with 0 verbal cues. ________________________________________________________________________________________________       PHYSICAL THERAPY JOINT CAMP ABBY: Initial Assessment and PM 6/15/2021  INPATIENT: Hospital Day: 1  Payor: Chiara Montoya / Plan: [address] [place] [address] HMO / Product Type: [employer] /      NAME/AGE/GENDER: Joey King is a 54 y.o. female   PRIMARY DIAGNOSIS:  Primary osteoarthritis of right hip [M16.11]   Procedure(s) and Anesthesia Type:     * HIP ARTHROPLASTY TOTAL/ RIGHT - Spinal (Right)  ICD-10: Treatment Diagnosis:    Pain in Right Hip (M25.551)  Stiffness of Right Hip, Not elsewhere classified (M25.651)  Difficulty in walking, Not elsewhere classified (R26.2)      ASSESSMENT:     Ms. Binta Guerin presents with decreased functional mobility and gait as well as decreased rom and strength of right LE s/p right abby. She plans to go home with HHPT.     This section established at most recent assessment   PROBLEM LIST (Impairments causing functional limitations):  Decreased Strength  Decreased ADL/Functional Activities  Decreased Transfer Abilities  Decreased Ambulation Ability/Technique  Decreased Balance  Increased Pain  Decreased Activity Tolerance  Decreased Flexibility/Joint Mobility  Decreased [place] with Home Exercise Program  Decreased strength   INTERVENTIONS PLANNED: (Benefits and precautions of physical therapy have been discussed with the patient.)  Bed Mobility  Cold  Gait Training  Home Exercise Program (HEP)  Range of Motion (ROM)  Therapeutic Activites  Therapeutic Exercise/Strengthening  Transfer Training     TREATMENT PLAN: Frequency/Duration: Follow patient BID for duration of hospital stay to address above goals. Rehabilitation Potential For Stated Goals: Good     RECOMMENDED REHABILITATION/EQUIPMENT: (at time of discharge pending progress): Continue Skilled Therapy and Home Health: Physical Therapy. HISTORY:   History of Present Injury/Illness (Reason for Referral):  S/p right abby  Past Medical History/Comorbidities:   Ms. Jalen Aguila  has a past medical history of Anxiety, Asthma, Chronic pain, DDD (degenerative disc disease), Depression, Diabetes mellitus ([facility] [address] Fibromyalgia, History of COVID-19 (05/10/2021), HTN (hypertension), and Hypercholesteremia. Ms. Jalen Agulia  has a past surgical history that includes hx total abdominal hysterectomy; hx knee replacement (Left); hx carpal tunnel release; hx other surgical; hx cyst removal; and hx cholecystectomy. Social History/Living Environment:   No data recorded  Prior Level of Function/Work/Activity:  Using walker limited distance   Number of Personal Factors/Comorbidities that affect the Plan of Care: 1-2: MODERATE COMPLEXITY   EXAMINATION:   Most Recent Physical Functioning:   Gross Assessment: Yes  Gross Assessment  AROM: Generally decreased, functional (left LE)  Strength: Generally decreased, functional (left LE)        RLE AROM  R Hip Flexion: 80  R Hip ABduction: 5            Bed Mobility  Supine to Sit: Moderate assistance  Scooting: Additional time; Moderate assistance    Transfers  Sit to Stand: Minimum assistance  Stand to Sit: Minimum assistance  Bed to Chair: Minimum assistance    Balance  Sitting: Intact  Standing: Pull to stand; With support              Weight Bearing Status  Right Side Weight Bearing: As tolerated  Distance (ft): 15 Feet (ft) (also 10 feet bathroom)  Ambulation - Level of Assistance: Minimal assistance  Assistive Device: Walker, rolling  Speed/Yeny: Delayed  Step Length: Left shortened;Right shortened  Stance: Right decreased  Gait Abnormalities: Antalgic;Decreased step clearance  Interventions: Safety awareness training;Verbal cues     Braces/Orthotics:     Right Hip Cold  Type:  (declined ice)      Body Structures Involved:  Bones  Joints  Muscles  Ligaments Body Functions Affected: Movement Related Activities and Participation Affected: Mobility   Number of elements that affect the Plan of Care: 3: MODERATE COMPLEXITY   CLINICAL PRESENTATION:   Presentation: Stable and uncomplicated: LOW COMPLEXITY   CLINICAL DECISION MAKIN[address] [place] [id number] AM-PAC 6 Clicks   Basic Mobility Inpatient Short Form  How much difficulty does the patient currently have. .. Unable A Lot A Little None   1. Turning over in bed (including adjusting bedclothes, sheets and blankets)? [] 1   [] 2   [x] 3   [] 4   2. Sitting down on and standing up from a chair with arms ( e.g., wheelchair, bedside commode, etc.)   [] 1   [] 2   [x] 3   [] 4   3. Moving from lying on back to sitting on the side of the bed? [] 1   [] 2   [x] 3   [] 4   How much help from another person does the patient currently need. .. Total A Lot A Little None   4. Moving to and from a bed to a chair (including a wheelchair)? [] 1   [] 2   [x] 3   [] 4   5. Need to walk in hospital room? [] 1   [] 2   [x] 3   [] 4   6. Climbing 3-5 steps with a railing? [] 1   [x] 2   [] 3   [] 4   © , Trustees of [address] [place] [address] [address] [address], under license to [employer]. All rights reserved     Score:  Initial: 17 Most Recent: X (Date: -- )    Interpretation of Tool:  Represents activities that are increasingly more difficult (i.e. Bed mobility, Transfers, Gait).     Medical Necessity:     Patient is expected to demonstrate progress in   strength, range of motion, and balance   to   decrease assistance required with exercises and functional mobility  . Reason for Services/Other Comments:  Patient   continues to require present interventions due to patient's inability to perform exercises and functional mobility independently  . Use of outcome tool(s) and clinical judgement create a POC that gives a: Clear prediction of patient's progress: LOW COMPLEXITY            TREATMENT:   (In addition to Assessment/Re-Assessment sessions the following treatments were rendered)     Pre-treatment Symptoms/Complaints:  hip pain  Pain Initial:      Post Session:  5   assessment  Therapeutic Exercise: (10 Minutes):  Exercises per grid below to improve mobility and strength. Required minimal visual, verbal, and manual cues to promote proper body alignment, promote proper body posture, and promote proper body mechanics. Progressed range and repetitions as indicated. Date:  6/15 Date:   Date:     ACTIVITY/EXERCISE AM PM AM PM AM PM   GROUP THERAPY  []  []  []  []  []  []   Ankle Pumps  10a       Quad Sets  10a       Gluteal Sets  10a       Hip ABd/ADduction  10aa       Straight Leg Raises         Knee Slides  10aa       Short Arc Quads         Long Arc Quads         Chair Slides                  B = bilateral; AA = active assistive; A = active; P = passive      Treatment/Session Assessment:     Response to Treatment:  did fine. Education:  [x] Home Exercises  [x] Fall Precautions  [x] Hip Precautions [] D/C Instruction Review  [x] Hip Prosthesis Review  [x] Walker Management/Safety [] Adaptive Equipment as Needed       Interdisciplinary Collaboration:   Occupational Therapist  Registered Nurse    After treatment position/precautions:   Up in chair  Bed/Chair-wheels locked  Bed in low position  Caregiver at bedside  Call light within reach  Family at bedside    Compliance with Program/Exercises:  Will assess as treatment progresses. Recommendations/Intent for next treatment session:  Treatment next visit will focus on increasing Ms. Waller's independence with bed mobility, transfers, gait training, strength/ROM exercises, modalities for pain, and patient education.       Total Treatment Duration:  PT Patient Time In/Time Out  Time In: 1500  Time Out: 92 Jelani De Paz PT

## 2021-06-15 NOTE — PROGRESS NOTES
Care Management Interventions  PCP Verified by CM: Yes  Mode of Transport at Discharge: Self  Transition of Care Consult (CM Consult): [address]: Yes  Discharge Durable Medical Equipment: Yes  Physical Therapy Consult: Yes  Occupational Therapy Consult: Yes  Current Support Network: Lives with Spouse  Confirm Follow Up Transport: Family  The Plan for Transition of Care is Related to the Following Treatment Goals : improve mobility  The Patient and/or Patient Representative was Provided with a Choice of Provider and Agrees with the Discharge Plan?: Yes  Freedom of Choice List was Provided with Basic Dialogue that Supports the Patient's Individualized Plan of Care/Goals, Treatment Preferences and Shares the Quality Data Associated with the Providers?: Yes  Discharge Location  Discharge Placement: Home with home health  Patient is a 54y.o. year old female admitted for Right RICKY . Patient plans to return home on discharge. Order received to arrange home health. Patient without preference towards agency. Referral sent to [facility]. Patient requesting we arrange a walker and bedside commode. Referral sent to [address] [place] [address] Str. delivered to the hospital room prior to discharge. Will follow until discharge.

## 2021-06-15 NOTE — PERIOP NOTES
1143: On arrival to PACU pt BGL 41. Half amp of d50 given per Dr. Nomi Chambers. Pt ate dianne cracker with peanut butter and drank some Pepsi. D50 fluids also increased to 75 ml/hr. 1210: BGL 37. Dr. Nomi Chambers notified. 1 amp of D50 given per Dr. Nomi Chambers. Pt also ate a cookie, and drank Pepsi.

## 2021-06-16 ENCOUNTER — APPOINTMENT (OUTPATIENT)
Dept: GENERAL RADIOLOGY | Age: 56
DRG: 470 | End: 2021-06-16
Attending: FAMILY MEDICINE
Payer: MEDICARE

## 2021-06-16 LAB
EST. AVERAGE GLUCOSE BLD GHB EST-MCNC: 146 MG/DL
GLUCOSE BLD STRIP.AUTO-MCNC: 114 MG/DL (ref 65–100)
GLUCOSE BLD STRIP.AUTO-MCNC: 149 MG/DL (ref 65–100)
HBA1C MFR BLD: 6.7 % (ref 4.2–6.3)
HGB BLD-MCNC: 9.9 G/DL (ref 11.7–15.4)
SERVICE CMNT-IMP: ABNORMAL
SERVICE CMNT-IMP: ABNORMAL

## 2021-06-16 PROCEDURE — 87040 BLOOD CULTURE FOR BACTERIA: CPT

## 2021-06-16 PROCEDURE — 65270000029 HC RM PRIVATE

## 2021-06-16 PROCEDURE — 85018 HEMOGLOBIN: CPT

## 2021-06-16 PROCEDURE — 97116 GAIT TRAINING THERAPY: CPT

## 2021-06-16 PROCEDURE — 74011250636 HC RX REV CODE- 250/636: Performed by: ORTHOPAEDIC SURGERY

## 2021-06-16 PROCEDURE — 97535 SELF CARE MNGMENT TRAINING: CPT

## 2021-06-16 PROCEDURE — 97530 THERAPEUTIC ACTIVITIES: CPT

## 2021-06-16 PROCEDURE — 77030027138 HC INCENT SPIROMETER -A

## 2021-06-16 PROCEDURE — 74011250637 HC RX REV CODE- 250/637: Performed by: NURSE PRACTITIONER

## 2021-06-16 PROCEDURE — 81003 URINALYSIS AUTO W/O SCOPE: CPT

## 2021-06-16 PROCEDURE — 36415 COLL VENOUS BLD VENIPUNCTURE: CPT

## 2021-06-16 PROCEDURE — 74011250637 HC RX REV CODE- 250/637: Performed by: ORTHOPAEDIC SURGERY

## 2021-06-16 PROCEDURE — 82962 GLUCOSE BLOOD TEST: CPT

## 2021-06-16 PROCEDURE — 74011000250 HC RX REV CODE- 250: Performed by: ORTHOPAEDIC SURGERY

## 2021-06-16 PROCEDURE — 71045 X-RAY EXAM CHEST 1 VIEW: CPT

## 2021-06-16 PROCEDURE — 83036 HEMOGLOBIN GLYCOSYLATED A1C: CPT

## 2021-06-16 PROCEDURE — 97110 THERAPEUTIC EXERCISES: CPT

## 2021-06-16 RX ORDER — DIPHENHYDRAMINE HCL 25 MG
25 CAPSULE ORAL
Status: DISCONTINUED | OUTPATIENT
Start: 2021-06-16 | End: 2021-06-17 | Stop reason: HOSPADM

## 2021-06-16 RX ORDER — ACETAMINOPHEN 325 MG/1
650 TABLET ORAL
Status: DISCONTINUED | OUTPATIENT
Start: 2021-06-16 | End: 2021-06-17 | Stop reason: HOSPADM

## 2021-06-16 RX ORDER — QUETIAPINE FUMARATE 100 MG/1
400 TABLET, FILM COATED ORAL
Status: DISCONTINUED | OUTPATIENT
Start: 2021-06-16 | End: 2021-06-17 | Stop reason: HOSPADM

## 2021-06-16 RX ORDER — QUETIAPINE FUMARATE 100 MG/1
200 TABLET, FILM COATED ORAL
Status: COMPLETED | OUTPATIENT
Start: 2021-06-16 | End: 2021-06-16

## 2021-06-16 RX ADMIN — Medication 81 MG: at 09:20

## 2021-06-16 RX ADMIN — Medication 10 ML: at 18:31

## 2021-06-16 RX ADMIN — Medication 10 ML: at 05:09

## 2021-06-16 RX ADMIN — ACETAMINOPHEN 650 MG: 325 TABLET, FILM COATED ORAL at 03:26

## 2021-06-16 RX ADMIN — HYDROCODONE BITARTRATE AND ACETAMINOPHEN 1 TABLET: 10; 325 TABLET ORAL at 20:26

## 2021-06-16 RX ADMIN — Medication 1 AMPULE: at 09:18

## 2021-06-16 RX ADMIN — QUETIAPINE FUMARATE 200 MG: 100 TABLET ORAL at 01:26

## 2021-06-16 RX ADMIN — CEFAZOLIN SODIUM 2 G: 100 INJECTION, POWDER, LYOPHILIZED, FOR SOLUTION INTRAVENOUS at 01:26

## 2021-06-16 RX ADMIN — Medication 1 AMPULE: at 20:27

## 2021-06-16 RX ADMIN — HYDROCODONE BITARTRATE AND ACETAMINOPHEN 1 TABLET: 10; 325 TABLET ORAL at 07:31

## 2021-06-16 RX ADMIN — METFORMIN HYDROCHLORIDE 1000 MG: 500 TABLET ORAL at 17:30

## 2021-06-16 RX ADMIN — Medication 10 ML: at 14:28

## 2021-06-16 RX ADMIN — METFORMIN HYDROCHLORIDE 1000 MG: 500 TABLET ORAL at 07:31

## 2021-06-16 RX ADMIN — HYDROMORPHONE HYDROCHLORIDE 1 MG: 1 INJECTION, SOLUTION INTRAMUSCULAR; INTRAVENOUS; SUBCUTANEOUS at 01:27

## 2021-06-16 RX ADMIN — HYDROMORPHONE HYDROCHLORIDE 1 MG: 1 INJECTION, SOLUTION INTRAMUSCULAR; INTRAVENOUS; SUBCUTANEOUS at 18:31

## 2021-06-16 RX ADMIN — DIPHENHYDRAMINE HYDROCHLORIDE 25 MG: 25 CAPSULE ORAL at 20:32

## 2021-06-16 RX ADMIN — QUETIAPINE FUMARATE 200 MG: 100 TABLET ORAL at 11:05

## 2021-06-16 RX ADMIN — CLONIDINE HYDROCHLORIDE 0.1 MG: 0.1 TABLET ORAL at 09:20

## 2021-06-16 RX ADMIN — GLIPIZIDE 5 MG: 5 TABLET ORAL at 07:31

## 2021-06-16 RX ADMIN — CLONIDINE HYDROCHLORIDE 0.1 MG: 0.1 TABLET ORAL at 20:27

## 2021-06-16 RX ADMIN — PIOGLITAZONE 15 MG: 15 TABLET ORAL at 07:31

## 2021-06-16 RX ADMIN — LOSARTAN POTASSIUM 100 MG: 50 TABLET, FILM COATED ORAL at 09:20

## 2021-06-16 RX ADMIN — HYDROMORPHONE HYDROCHLORIDE 1 MG: 1 INJECTION, SOLUTION INTRAMUSCULAR; INTRAVENOUS; SUBCUTANEOUS at 14:27

## 2021-06-16 RX ADMIN — Medication 10 ML: at 20:56

## 2021-06-16 RX ADMIN — DIPHENHYDRAMINE HYDROCHLORIDE 25 MG: 25 CAPSULE ORAL at 14:41

## 2021-06-16 RX ADMIN — DOCUSATE SODIUM 50MG AND SENNOSIDES 8.6MG 2 TABLET: 8.6; 5 TABLET, FILM COATED ORAL at 09:20

## 2021-06-16 RX ADMIN — HYDROCODONE BITARTRATE AND ACETAMINOPHEN 1 TABLET: 10; 325 TABLET ORAL at 11:06

## 2021-06-16 RX ADMIN — Medication 81 MG: at 20:27

## 2021-06-16 NOTE — PROGRESS NOTES
[facility]ist Service Progress Note    INTERVAL HISTORY  / Subjective:      Assessment / Plan:  Tanika Scott is a 54 y.o. female, who  has a past medical history of Anxiety, Asthma, Chronic pain, DDD (degenerative disc disease), Depression, Diabetes mellitus ([address] Fibromyalgia, History of COVID-19 (05/10/2021), HTN (hypertension), and Hypercholesteremia.,  has a past surgical history that includes hx total abdominal hysterectomy; hx knee replacement (Left); hx carpal tunnel release; hx other surgical; hx cyst removal; and hx cholecystectomy.  Who presents with scheduled right hip arthroplasty for end-stage right hip osteoarthritis         End-stage right hip osteoarthritis status post right total hip arthroplasty with Dr. Lamberto Gamble on [address]  No documented complications, and aspirin 81 mg twice daily for DVT prophylaxis, continue pain control, follow physical therapy occupational therapy evaluation     History of diabetes type 2on Metformin and glipizide Actos resumed, renal functions reassuring, consider additional management glucose greater than 180     History of bipolar 1resume home high-dose Seroquel nightly, clinically likely stable, monitor     History hypertensions on losartan and clonidine resume prior although primary, continue monitor adjust as needed     Morbid obesity BMI of 42 due to excess calorieslifestyle education regarding diet           Objective:  Visit Vitals  /78 (BP 1 Location: Right upper arm, BP Patient Position: At rest)   Pulse (!) 104   Temp 99.9 °F (37.7 °C)   Resp 16   Ht 5' 4\" (1.626 m)   Wt 111.9 kg (246 lb 11.2 oz)   SpO2 92%   Breastfeeding No   BMI 42.35 kg/m²                 Physical Exam:  General: No acute distress, speaking in full sentences, no use of accessory muscles   HEENT: Pupils equal and reactive to light and accommodation, oropharynx is clear   Neck: Supple, no lymphadenopathy, no JVD   Lungs: Clear to auscultation bilaterally   Cardiovascular: Regular rate and rhythm with normal S1 and S2   Abdomen: Soft, nontender, nondistended, normoactive bowel sounds   Extremities: No cyanosis clubbing or edema   Neuro: Nonfocal, A&O x3   Psych: Normal affect     Intake and Output:  Date 06/15/21 0700 - 06/16/21 0659 06/16/21 0700 - 06/17/21 0659   Shift 8995-3582 9063-2023 24 Hour Total 5683-2817 6421-9739 24 Hour Total   INTAKE   I.V.(mL/kg/hr) 1500(1.1)  1500(0.6)        Volume (lactated Ringers infusion) 1500  1500      Shift Total(mL/kg) 1500(13.4)  1500(13.4)      OUTPUT   Urine(mL/kg/hr) 0(0)  0(0)        Urine Output 0  0      Blood 250  250        Estimated Blood Loss 250  250      Shift Total(mL/kg) 250(2.2)  250(2.2)      NET 1250  1250      Weight (kg) 111.9 111.9 111.9 111.9 111.9 111.9       LAB:  Admission on 06/15/2021   Component Date Value    Crossmatch Expiration 06/15/2021 06/18/2021,2359     ABO/Rh(D) 06/15/2021 O POSITIVE     Antibody screen 06/15/2021 NEG     Glucose (POC) 06/15/2021 63*    Performed by 06/15/2021 MoseleyRNJordan     Glucose (POC) 06/15/2021 60*    Performed by 06/15/2021 MoseleyRNJordan     Glucose (POC) 06/15/2021 41*    Performed by 06/15/2021 CoatsBrookeADN     Glucose (POC) 06/15/2021 37*    Performed by 06/15/2021 CoatsBrookeADN     Glucose (POC) 06/15/2021 70     Performed by 06/15/2021 CoatsBrookeADN     Glucose (POC) 06/15/2021 61*    Performed by 06/15/2021 KennedyBright     Glucose (POC) 06/15/2021 80     Performed by 06/15/2021 MortonRNChristyJacqueline     Glucose (POC) 06/15/2021 32*    Performed by 06/15/2021 KennedyBright     Glucose (POC) 06/15/2021 73     Performed by 06/15/2021 MortonRNChristyJacqueline     Glucose (POC) 06/15/2021 112*    Performed by 06/15/2021 MortonRNChristyJacqueline     Glucose (POC) 06/15/2021 130*    Performed by 06/15/2021 CordovaYelinaADN     Glucose (POC) 06/15/2021 155*    Performed by 06/15/2021 MoralesCharlineyPCT     HGB 06/16/2021 9.9*    Hemoglobin A1c 06/16/2021 6.7*    Est. average glucose 06/16/2021 146     Glucose (POC) 06/16/2021 149*    Performed by 06/16/2021 MoralesMarlenyPCT     Glucose (POC) 06/16/2021 114*    Performed by 06/16/2021 Cynthia Nguyen        IMAGING:  XR PELV AP ONLY    Result Date: 6/15/2021  1. Right hip prosthesis placement. EKG:  No results found for this or any previous visit.           Kayce Dominique MD  6/16/2021 6:42 PM

## 2021-06-16 NOTE — PROGRESS NOTES
Orthopedic Joint Progress Note    2021  Admit Date: 6/15/2021  Admit Diagnosis: Primary osteoarthritis of right hip [M16.11]  Osteoarthritis of right hip [M16.11]    1 Day Post-Op    Subjective:     Ras Lo is lying in the recliner chair, drowsy, comfortable,  at bedside. Itching without rash, advised benadryl. Review of Systems: Pertinent items are noted in HPI. Objective:     PT/OT:     PATIENT MOBILITY    Bed Mobility  Supine to Sit: Moderate assistance, Additional time, Assist x2  Scooting: Moderate assistance, Additional time, Assist x2  Transfers  Sit to Stand: Contact guard assistance, Minimum assistance  Stand to Sit: Contact guard assistance, Minimum assistance  Bed to Chair: Contact guard assistance, Minimum assistance      Gait  Speed/Yeny: Delayed  Step Length: Left shortened, Right shortened  Stance: Right decreased  Gait Abnormalities: Antalgic, Decreased step clearance  Ambulation - Level of Assistance: Contact guard assistance, Minimal assistance  Distance (ft): 20 Feet (ft)  Assistive Device: Walker, rolling  Interventions: Safety awareness training  Duration: 15 Minutes   Weight Bearing Status  Right Side Weight Bearing: As tolerated        Vital Signs:    Blood pressure 123/80, pulse 93, temperature 98.2 °F (36.8 °C), resp. rate 16, height 5' 4\" (1.626 m), weight 111.9 kg (246 lb 11.2 oz), SpO2 98 %, not currently breastfeeding.   Temp (24hrs), Av.9 °F (37.7 °C), Min:98.2 °F (36.8 °C), Max:102.1 °F (38.9 °C)      Pain Control:   Pain Assessment  Pain Scale 1: Numeric (0 - 10)  Pain Intensity 1: 8 (pain meds given)  Pain Location 1: Hip  Pain Orientation 1: Right    Meds:  Current Facility-Administered Medications   Medication Dose Route Frequency    acetaminophen (TYLENOL) tablet 650 mg  650 mg Oral Q6H PRN    QUEtiapine (SEROquel) tablet 400 mg  400 mg Oral QHS PRN    diphenhydrAMINE (BENADRYL) capsule 25 mg  25 mg Oral Q6H PRN    dextrose 5% infusion 30 mL/hr IntraVENous CONTINUOUS    cloNIDine HCL (CATAPRES) tablet 0.1 mg  0.1 mg Oral BID    [START ON 6/22/2021] exenatide microspheres serr 2 mg (Patient Supplied)  2 mg SubCUTAneous every Tuesday    losartan (COZAAR) tablet 100 mg  100 mg Oral DAILY    metFORMIN (GLUCOPHAGE) tablet 1,000 mg  1,000 mg Oral BID WITH MEALS    zolpidem (AMBIEN) tablet 5 mg  5 mg Oral QHS PRN    alcohol 62% (NOZIN) nasal  1 Ampule  1 Ampule Topical Q12H    0.9% sodium chloride infusion  100 mL/hr IntraVENous CONTINUOUS    sodium chloride (NS) flush 5-40 mL  5-40 mL IntraVENous Q8H    sodium chloride (NS) flush 5-40 mL  5-40 mL IntraVENous PRN    naloxone (NARCAN) injection 0.2-0.4 mg  0.2-0.4 mg IntraVENous Q10MIN PRN    senna-docusate (PERICOLACE) 8.6-50 mg per tablet 2 Tablet  2 Tablet Oral DAILY    HYDROcodone-acetaminophen (NORCO)  mg tablet 1 Tablet  1 Tablet Oral Q4H PRN    HYDROmorphone (DILAUDID) injection 1 mg  1 mg IntraVENous Q3H PRN    aspirin delayed-release tablet 81 mg  81 mg Oral Q12H    dextrose 40% (GLUTOSE) oral gel 1 Tube  15 g Oral PRN    glucagon (GLUCAGEN) injection 1 mg  1 mg IntraMUSCular PRN    dextrose (D50W) injection syrg 12.5-25 g  25-50 mL IntraVENous PRN    glipiZIDE (GLUCOTROL) tablet 5 mg  5 mg Oral ACB    pioglitazone (ACTOS) tablet 15 mg  15 mg Oral ACB    ondansetron (ZOFRAN ODT) tablet 4 mg  4 mg Oral Q8H PRN        LAB:    No results found for: INR, INREXT  Lab Results   Component Value Date/Time    HGB 9.9 (L) 06/16/2021 04:58 AM    HGB 11.6 (L) 06/09/2021 04:09 PM    HGB 14.2 07/07/2020 07:46 PM       Incision 06/15/21 Hip Right (Active)   Dressing Status Clean;Dry; Intact 06/16/21 0758   Dressing/Treatment ABD pad 06/16/21 0758   Drainage Amount None 06/16/21 0758   Number of days: 1         Physical Exam:  Musculoskeletal: negative  positive for - joint pain-right hip RICKY yesterday, dressing intact.      Assessment:      Principal Problem:    Osteoarthritis of right hip (6/13/2021)         Plan:     Continue PT/OT/Rehab  Consult: NA          Signed By: Shellei Vale NP

## 2021-06-16 NOTE — ADDENDUM NOTE
Addendum  created 06/16/21 0755 by Puma Harris CRNA    Flowsheet accepted, Intraprocedure Flowsheets edited

## 2021-06-16 NOTE — PROGRESS NOTES
06/15/21 2201   Oxygen Therapy   O2 Sat (%) 97 %   Pulse via Oximetry 100 beats per minute   O2 Device None (Room air)   Incentive Spirometry Treatment   Actual Volume (ml)   (pt ready for bed)     C/s placed on patient alarms set per protocol.

## 2021-06-16 NOTE — PROGRESS NOTES
Problem: Mobility Impaired (Adult and Pediatric)  Goal: *Acute Goals and Plan of Care (Insert Text)  Outcome: Progressing Towards Goal  Note: GOALS (1-4 days):  (1.)Ms. Waller will move from supine to sit and sit to supine  in bed with STAND BY ASSIST.    (2.)Ms. Waller will transfer from bed to chair and chair to bed with STAND BY ASSIST using the least restrictive device. (3.)Ms. Waller will ambulate with STAND BY ASSIST for 100 feet with the least restrictive device. (4.)Ms. Waller will ambulate up/down 2 steps with bilateral  railing with MINIMAL ASSIST with no device. (5.)Ms. Waller will state/observe RICKY precautions with 0 verbal cues. ________________________________________________________________________________________________       PHYSICAL THERAPY JOINT CAMP RICKY: Daily Note and AM 6/16/2021  INPATIENT: Hospital Day: 2  Payor: Diane Keyes / Plan: [address] [place] [address] HMO / Product Type: [facility] [employer] /      NAME/AGE/GENDER: Rowan Álvarez is a 54 y.o. female   PRIMARY DIAGNOSIS:  Primary osteoarthritis of right hip [M16.11]   Procedure(s) and Anesthesia Type:     * HIP ARTHROPLASTY TOTAL/ RIGHT - Spinal (Right)  ICD-10: Treatment Diagnosis:    · Pain in Right Hip (M25.551)  · Stiffness of Right Hip, Not elsewhere classified (M25.651)  · Difficulty in walking, Not elsewhere classified (R26.2)      ASSESSMENT:     Ms. Simon Shanks presents with decreased functional mobility and gait as well as decreased rom and strength of right LE s/p right ricky. She plans to go home with HHPT.  6/16 supine upon arrival.  Performs TH exercises with guidance and verbal cues to stay within hip precaution. Work on bed mobility as follows: supine>EOB with Mod A x 2 with extra time. Sat on EOB for few min. Then stood for few min with Min>CGA A with extra time. Then walk 20 ft using RW with Min>CGA A with extra time and verbal cues into the restroom. Independent with hygiene.   Then stood up and transfer to recliner. Therapist rolled pt out of the restroom. Left in recliner with needs in reach. Nurse came in to give pain med. This section established at most recent assessment   PROBLEM LIST (Impairments causing functional limitations):  1. Decreased Strength  2. Decreased ADL/Functional Activities  3. Decreased Transfer Abilities  4. Decreased Ambulation Ability/Technique  5. Decreased Balance  6. Increased Pain  7. Decreased Activity Tolerance  8. Decreased Flexibility/Joint Mobility  9. Decreased [place] with Home Exercise Program  10. Decreased strength   INTERVENTIONS PLANNED: (Benefits and precautions of physical therapy have been discussed with the patient.)  1. Bed Mobility  2. Cold  3. Gait Training  4. Home Exercise Program (HEP)  5. Range of Motion (ROM)  6. Therapeutic Activites  7. Therapeutic Exercise/Strengthening  8. Transfer Training     TREATMENT PLAN: Frequency/Duration: Follow patient BID for duration of hospital stay to address above goals. Rehabilitation Potential For Stated Goals: Good     RECOMMENDED REHABILITATION/EQUIPMENT: (at time of discharge pending progress): Continue Skilled Therapy and Home Health: Physical Therapy. HISTORY:   History of Present Injury/Illness (Reason for Referral):  S/p right abby  Past Medical History/Comorbidities:   Ms. Randi Magallanes  has a past medical history of Anxiety, Asthma, Chronic pain, DDD (degenerative disc disease), Depression, Diabetes mellitus ([facility] [address] Fibromyalgia, History of COVID-19 (05/10/2021), HTN (hypertension), and Hypercholesteremia. Ms. Randi Magallanes  has a past surgical history that includes hx total abdominal hysterectomy; hx knee replacement (Left); hx carpal tunnel release; hx other surgical; hx cyst removal; and hx cholecystectomy.    Social History/Living Environment:   No data recorded  Prior Level of Function/Work/Activity:  Using walker limited distance   Number of Personal Factors/Comorbidities that affect the Plan of Care: 1-2: MODERATE COMPLEXITY   EXAMINATION:   Most Recent Physical Functioning:                            Bed Mobility  Supine to Sit: Moderate assistance; Additional time;Assist x2  Scooting: Moderate assistance; Additional time;Assist x2    Transfers  Sit to Stand: Contact guard assistance;Minimum assistance  Stand to Sit: Contact guard assistance;Minimum assistance  Bed to Chair: Contact guard assistance;Minimum assistance    Balance  Sitting: Intact  Standing: Pull to stand; With support              Weight Bearing Status  Right Side Weight Bearing: As tolerated  Distance (ft): 20 Feet (ft)  Ambulation - Level of Assistance: Contact guard assistance;Minimal assistance  Assistive Device: Walker, rolling  Speed/Yeny: Delayed  Step Length: Left shortened;Right shortened  Stance: Right decreased  Gait Abnormalities: Antalgic;Decreased step clearance  Interventions: Safety awareness training     Braces/Orthotics:     Right Hip Cold  Type: Cold/ice packs      Body Structures Involved:  1. Bones  2. Joints  3. Muscles  4. Ligaments Body Functions Affected:  1. Movement Related Activities and Participation Affected:  1. Mobility   Number of elements that affect the Plan of Care: 3: MODERATE COMPLEXITY   CLINICAL PRESENTATION:   Presentation: Stable and uncomplicated: LOW COMPLEXITY   CLINICAL DECISION MAKIN[address] [place] [address] AM-PAC 6 Clicks   Basic Mobility Inpatient Short Form  How much difficulty does the patient currently have. .. Unable A Lot A Little None   1. Turning over in bed (including adjusting bedclothes, sheets and blankets)? [] 1   [] 2   [x] 3   [] 4   2. Sitting down on and standing up from a chair with arms ( e.g., wheelchair, bedside commode, etc.)   [] 1   [] 2   [x] 3   [] 4   3. Moving from lying on back to sitting on the side of the bed? [] 1   [] 2   [x] 3   [] 4   How much help from another person does the patient currently need. .. Total A Lot A Little None   4.   Moving to and from a bed to a chair (including a wheelchair)? [] 1   [] 2   [x] 3   [] 4   5. Need to walk in hospital room? [] 1   [] 2   [x] 3   [] 4   6. Climbing 3-5 steps with a railing? [] 1   [x] 2   [] 3   [] 4   © 2007, Trustees of [address] [place] [address], under license to [employer]. All rights reserved     Score:  Initial: 17 Most Recent: X (Date: -- )    Interpretation of Tool:  Represents activities that are increasingly more difficult (i.e. Bed mobility, Transfers, Gait). Medical Necessity:     · Patient is expected to demonstrate progress in   · strength, range of motion, and balance  ·  to   · decrease assistance required with exercises and functional mobility  · . Reason for Services/Other Comments:  · Patient   · continues to require present interventions due to patient's inability to perform exercises and functional mobility independently  · . Use of outcome tool(s) and clinical judgement create a POC that gives a: Clear prediction of patient's progress: LOW COMPLEXITY            TREATMENT:   (In addition to Assessment/Re-Assessment sessions the following treatments were rendered)     Pre-treatment Symptoms/Complaints:  Some pain  Pain Initial:   Pain Intensity 1: 8 (pain meds given)  Post Session:    assessment  Therapeutic Exercise: (15 Minutes):  Exercises per grid below to improve mobility and strength. Required minimal visual, verbal, and manual cues to promote proper body alignment, promote proper body posture, and promote proper body mechanics. Progressed range and repetitions as indicated. Gait Training (15 Minutes):  Gait training to improve and/or restore physical functioning as related to mobility. Ambulated 20 Feet (ft) with Contact guard assistance;Minimal assistance using a Walker, rolling and minimal Safety awareness training related to their hip position and motion to promote proper body alignment.   .  Therapeutic Activity: (  8 Minutes ):  Therapeutic activities including Chair transfers, Toilet transfers and  to improve strength and balance. Required minimal Safety awareness training to      Date:  6/15 Date:  6/16   Date:     ACTIVITY/EXERCISE AM PM AM PM AM PM   GROUP THERAPY  []  []  []  []  []  []   Ankle Pumps  10a 15       Quad Sets  10a 15      Gluteal Sets  10a 15      Hip ABd/ADduction  10aa 15 aa      Straight Leg Raises         Knee Slides  10aa 15 aa      Short Arc Quads   15 aa      Long Arc Quads   15       Chair Slides                  B = bilateral; AA = active assistive; A = active; P = passive      Treatment/Session Assessment:     Response to Treatment:  did fair due to pain    Education:  [x] Home Exercises  [x] Fall Precautions  [x] Hip Precautions [] D/C Instruction Review  [x] Hip Prosthesis Review  [x] Walker Management/Safety [] Adaptive Equipment as Needed       Interdisciplinary Collaboration:   o Physical Therapy Assistant  o Registered Nurse    After treatment position/precautions:   o Up in chair  o Bed/Chair-wheels locked  o Bed in low position  o Caregiver at bedside  o Call light within reach  o Family at bedside    Compliance with Program/Exercises: Will assess as treatment progresses. Recommendations/Intent for next treatment session:  Treatment next visit will focus on increasing Ms. Waller's independence with bed mobility, transfers, gait training, strength/ROM exercises, modalities for pain, and patient education.       Total Treatment Duration:  PT Patient Time In/Time Out  Time In: 1015  Time Out: [address], [place]

## 2021-06-16 NOTE — PROGRESS NOTES
Problem: Mobility Impaired (Adult and Pediatric)  Goal: *Acute Goals and Plan of Care (Insert Text)  Outcome: Progressing Towards Goal  Note: GOALS (1-4 days):  (1.)Ms. Waller will move from supine to sit and sit to supine  in bed with STAND BY ASSIST.    (2.)Ms. Waller will transfer from bed to chair and chair to bed with STAND BY ASSIST using the least restrictive device. (3.)Ms. Waller will ambulate with STAND BY ASSIST for 100 feet with the least restrictive device. (4.)Ms. Waller will ambulate up/down 2 steps with bilateral  railing with MINIMAL ASSIST with no device. (5.)Ms. Waller will state/observe ABBY precautions with 0 verbal cues. ________________________________________________________________________________________________       PHYSICAL THERAPY JOINT CAMP ABBY: Daily Note and PM 6/16/2021  INPATIENT: Hospital Day: 2  Payor: Heron Snider / Plan: [address] [place] [address] HMO / Product Type: Managed Care Medicare /      NAME/AGE/GENDER: Geeta Betancourt is a 54 y.o. female   PRIMARY DIAGNOSIS:  Primary osteoarthritis of right hip [M16.11]   Procedure(s) and Anesthesia Type:     * HIP ARTHROPLASTY TOTAL/ RIGHT - Spinal (Right)  ICD-10: Treatment Diagnosis:    · Pain in Right Hip (M25.551)  · Stiffness of Right Hip, Not elsewhere classified (M25.651)  · Difficulty in walking, Not elsewhere classified (R26.2)      ASSESSMENT:     Ms. Karely Garsia presents with decreased functional mobility and gait as well as decreased rom and strength of right LE s/p right abby. She plans to go home with HHPT.  6/16 supine upon arrival.  Performs TH exercises with guidance and verbal cues to stay within hip precaution. Work on bed mobility as follows: supine>EOB with Mod A x 2 with extra time. Sat on EOB for few min. Then stood for few min with Min>CGA A with extra time. Then walk 20 ft using RW with Min>CGA A with extra time and verbal cues into the restroom. Independent with hygiene.   Then stood up and transfer to recliner. Therapist rolled pt out of the restroom. Left in recliner with needs in reach. Nurse came in to give pain med. 6/16 sitting in the recliner upon arrival.  Performs R TH exercises with guidance and verbal cues, with pain. Sit>slide out to edge of recliner with Min A and extra time and verbal cues. Then sit>stand with Min A and extra time. Then walk 10 ft using RW with Min A extra time and verbal cues. Sat on EOB and rested, then therapist got both leg and family member guided her back, so she could lay down. Then rested a few min. Therapist help put pt on left side lying with pillow behind back and one between her knees and needs in reach. The nurse was in to give her dilaudid IV. Hope to progress tomorrow with gait and exercises. This section established at most recent assessment   PROBLEM LIST (Impairments causing functional limitations):  1. Decreased Strength  2. Decreased ADL/Functional Activities  3. Decreased Transfer Abilities  4. Decreased Ambulation Ability/Technique  5. Decreased Balance  6. Increased Pain  7. Decreased Activity Tolerance  8. Decreased Flexibility/Joint Mobility  9. Decreased [place] with Home Exercise Program  10. Decreased strength   INTERVENTIONS PLANNED: (Benefits and precautions of physical therapy have been discussed with the patient.)  1. Bed Mobility  2. Cold  3. Gait Training  4. Home Exercise Program (HEP)  5. Range of Motion (ROM)  6. Therapeutic Activites  7. Therapeutic Exercise/Strengthening  8. Transfer Training     TREATMENT PLAN: Frequency/Duration: Follow patient BID for duration of hospital stay to address above goals. Rehabilitation Potential For Stated Goals: Good     RECOMMENDED REHABILITATION/EQUIPMENT: (at time of discharge pending progress): Continue Skilled Therapy and Home Health: Physical Therapy.               HISTORY:   History of Present Injury/Illness (Reason for Referral):  S/p right abby  Past Medical History/Comorbidities:   Ms. Kika Blanchard  has a past medical history of Anxiety, Asthma, Chronic pain, DDD (degenerative disc disease), Depression, Diabetes mellitus ([address] Fibromyalgia, History of COVID-19 (05/10/2021), HTN (hypertension), and Hypercholesteremia. Ms. Kika Blanchard  has a past surgical history that includes hx total abdominal hysterectomy; hx knee replacement (Left); hx carpal tunnel release; hx other surgical; hx cyst removal; and hx cholecystectomy. Social History/Living Environment:   No data recorded  Prior Level of Function/Work/Activity:  Using walker limited distance   Number of Personal Factors/Comorbidities that affect the Plan of Care: 1-2: MODERATE COMPLEXITY   EXAMINATION:   Most Recent Physical Functioning:                            Bed Mobility  Supine to Sit: Moderate assistance; Additional time;Assist x2  Sit to Supine: Moderate assistance; Additional time  Scooting: Moderate assistance; Additional time    Transfers  Sit to Stand: Minimum assistance  Stand to Sit: Minimum assistance  Bed to Chair: Minimum assistance    Balance  Sitting: Intact  Standing: Pull to stand; With support              Weight Bearing Status  Right Side Weight Bearing: As tolerated  Distance (ft): 10 Feet (ft) (having pain)  Ambulation - Level of Assistance: Minimal assistance; Additional time  Assistive Device: Walker, rolling  Speed/Yeny: Delayed  Step Length: Left shortened;Right shortened  Stance: Right decreased  Gait Abnormalities: Antalgic;Decreased step clearance  Interventions: Safety awareness training     Braces/Orthotics:     Right Hip Cold  Type:  (does not like ice)      Body Structures Involved:  1. Bones  2. Joints  3. Muscles  4. Ligaments Body Functions Affected:  1. Movement Related Activities and Participation Affected:  1.  Mobility   Number of elements that affect the Plan of Care: 3: MODERATE COMPLEXITY   CLINICAL PRESENTATION:   Presentation: Stable and uncomplicated: LOW COMPLEXITY   CLINICAL DECISION MAKIN[address] [place] [address] AM-PAC 6 Clicks   Basic Mobility Inpatient Short Form  How much difficulty does the patient currently have. .. Unable A Lot A Little None   1. Turning over in bed (including adjusting bedclothes, sheets and blankets)? [] 1   [] 2   [x] 3   [] 4   2. Sitting down on and standing up from a chair with arms ( e.g., wheelchair, bedside commode, etc.)   [] 1   [] 2   [x] 3   [] 4   3. Moving from lying on back to sitting on the side of the bed? [] 1   [] 2   [x] 3   [] 4   How much help from another person does the patient currently need. .. Total A Lot A Little None   4. Moving to and from a bed to a chair (including a wheelchair)? [] 1   [] 2   [x] 3   [] 4   5. Need to walk in hospital room? [] 1   [] 2   [x] 3   [] 4   6. Climbing 3-5 steps with a railing? [] 1   [x] 2   [] 3   [] 4   © 2007, Trustees of [address] [place] [address], under license to [employer]. All rights reserved     Score:  Initial: 17 Most Recent: X (Date: -- )    Interpretation of Tool:  Represents activities that are increasingly more difficult (i.e. Bed mobility, Transfers, Gait). Medical Necessity:     · Patient is expected to demonstrate progress in   · strength, range of motion, and balance  ·  to   · decrease assistance required with exercises and functional mobility  · . Reason for Services/Other Comments:  · Patient   · continues to require present interventions due to patient's inability to perform exercises and functional mobility independently  · .    Use of outcome tool(s) and clinical judgement create a POC that gives a: Clear prediction of patient's progress: LOW COMPLEXITY            TREATMENT:   (In addition to Assessment/Re-Assessment sessions the following treatments were rendered)     Pre-treatment Symptoms/Complaints: painful  Pain Initial:   Pain Intensity 1: 8 (dilaudid given)  Post Session:    assessment  Therapeutic Exercise: (15 Minutes):  Exercises per grid below to improve mobility and strength. Required minimal visual, verbal, and manual cues to promote proper body alignment, promote proper body posture, and promote proper body mechanics. Progressed range and repetitions as indicated. Gait Training (0 Minutes):  Gait training to improve and/or restore physical functioning as related to mobility. Ambulated 10 Feet (ft) (having pain) with Minimal assistance; Additional time using a Walker, rolling and minimal Safety awareness training related to their hip position and motion to promote proper body alignment. .  Therapeutic Activity: (  23 Minutes ):  Therapeutic activities including Chair transfers, Toilet transfers and  to improve strength and balance. Required minimal Safety awareness training to      Date:  6/15 Date:  6/16   Date:     ACTIVITY/EXERCISE AM PM AM PM AM PM   GROUP THERAPY  []  []  []  []  []  []   Ankle Pumps  10a 15  15     Quad Sets  10a 15 15     Gluteal Sets  10a 15 15     Hip ABd/ADduction  10aa 15 aa 15 aa     Straight Leg Raises         Knee Slides  10aa 15 aa 15 aa     Short Arc Quads   15 aa 15 aa     Long Arc Quads   15  painful     Chair Slides                  B = bilateral; AA = active assistive; A = active; P = passive      Treatment/Session Assessment:     Response to Treatment:  Slow progress due to pain    Education:  [x] Home Exercises  [x] Fall Precautions  [x] Hip Precautions [] D/C Instruction Review  [x] Hip Prosthesis Review  [x] Walker Management/Safety [] Adaptive Equipment as Needed       Interdisciplinary Collaboration:   o Physical Therapy Assistant  o Registered Nurse    After treatment position/precautions:   o Supine in bed  o Bed/Chair-wheels locked  o Bed in low position  o Caregiver at bedside  o Call light within reach  o Family at bedside    Compliance with Program/Exercises: Will assess as treatment progresses. Recommendations/Intent for next treatment session:  Treatment next visit will focus on increasing Ms. Waller's independence with bed mobility, transfers, gait training, strength/ROM exercises, modalities for pain, and patient education.       Total Treatment Duration:  PT Patient Time In/Time Out  Time In: 1345  Time Out: [address] [facility] [address] [place], [facility]

## 2021-06-16 NOTE — PROGRESS NOTES
Problem: Self Care Deficits Care Plan (Adult)  Goal: *Acute Goals and Plan of Care (Insert Text)  Outcome: Progressing Towards Goal  Note: GOALS:   DISCHARGE GOALS (in preparation for going home/rehab):  3 days  1. Ms. Irving Diaz will perform one lower body dressing activity with minimal assistance with adaptive equipment to demonstrate improved functional mobility and safety. 2.  Ms. Irving Diaz will perform one lower body bathing activity with minimal  assistance with adaptive equipment to demonstrate improved functional mobility and safety. 3.  Ms. Irving Diaz will perform toileting/toilet transfer with contact guard assistance with adaptive equipment to demonstrate improved functional mobility and safety. 4.  Ms. Irving Diaz will perform shower transfer with contact guard assistance with adaptive equipment to demonstrate improved functional mobility and safety. 5.  Ms. Irving Diaz will state RICKY precautions with two verbal cues to demonstrate improved functional mobility and safety. JOINT CAMP OCCUPATIONAL THERAPY RICKY: Daily Note, Treatment Day: 2nd and AM 6/16/2021  INPATIENT: Hospital Day: 2  Payor: Sameer Rich / Plan: [address] [place] [address] HMO / Product Type: Managed Care Medicare /      NAME/AGE/GENDER: Norberto Weiss is a 54 y.o. female   PRIMARY DIAGNOSIS:  Primary osteoarthritis of right hip [M16.11]   Procedure(s) and Anesthesia Type:     * HIP ARTHROPLASTY TOTAL/ RIGHT - Spinal (Right)  ICD-10: Treatment Diagnosis:    · Pain in Right Hip (M25.551)  · Stiffness of Right Hip, Not elsewhere classified (M25.651)      ASSESSMENT:      Ms. Irving Diaz is s/p right RICKY and presents with decreased weight bearing on right LE and decreased independence with functional mobility and activities of daily living. Patient needed encouragement to get oob. She is having a lot of pain. She transferred to B with mod x 2. She stood and ambulated to the bathroom with Min to [facility]. She toileted and returned to recliner. She declined shower at that time. Her daughters are bringing her clothes this afternoon. OT to check on her this afternoon. 1430pm Ot returned patient declined bathing at this time. She had returend to bed. OT to see her in am for full ADL session. This section established at most recent assessment   PROBLEM LIST (Impairments causing functional limitations):  1. Decreased Strength  2. Decreased ADL/Functional Activities  3. Decreased Transfer Abilities  4. Increased Pain  5. Increased Fatigue  6. Decreased Flexibility/Joint Mobility  7. Decreased Knowledge of Precautions   INTERVENTIONS PLANNED: (Benefits and precautions of occupational therapy have been discussed with the patient.)  1. Activities of daily living training  2. Adaptive equipment training  3. Balance training  4. Clothing management  5. Donning&doffing training  6. Theraputic activity     TREATMENT PLAN: Frequency/Duration: Follow patient 1-2 times to address above goals. Rehabilitation Potential For Stated Goals: Good     RECOMMENDED REHABILITATION/EQUIPMENT: (at time of discharge pending progress): Continue Skilled Therapy. OCCUPATIONAL PROFILE AND HISTORY:   History of Present Injury/Illness (Reason for Referral): Pt presents this date s/p (right) RICKY. Past Medical History/Comorbidities:   Ms. Omero Nolan  has a past medical history of Anxiety, Asthma, Chronic pain, DDD (degenerative disc disease), Depression, Diabetes mellitus ([facility] [address] Fibromyalgia, History of COVID-19 (05/10/2021), HTN (hypertension), and Hypercholesteremia. Ms. Omero Nolan  has a past surgical history that includes hx total abdominal hysterectomy; hx knee replacement (Left); hx carpal tunnel release; hx other surgical; hx cyst removal; and hx cholecystectomy. Social History/Living Environment:   No data recorded  Prior Level of Function/Work/Activity:  Pt was (I) with ADL's and ambulated short distances.       Number of Personal Factors/Comorbidities that affect the Plan of Care: Brief history (0):  LOW COMPLEXITY   ASSESSMENT OF OCCUPATIONAL PERFORMANCE[de-identified]   Most Recent Physical Functioning:   Balance  Sitting: Intact  Standing: Pull to stand; With support                              Mental Status  Neurologic State: Alert; Appropriate for age  Orientation Level: Appropriate for age  Cognition: Appropriate decision making; Appropriate for age attention/concentration; Appropriate safety awareness; Follows commands  Perception: Appears intact  Perseveration: No perseveration noted  Safety/Judgement: Awareness of environment; Fall prevention                Basic ADLs (From Assessment) Complex ADLs (From Assessment)   Basic ADL  Feeding: Independent  Oral Facial Hygiene/Grooming: Supervision  Bathing: Moderate assistance  Upper Body Dressing: Supervision  Lower Body Dressing: Moderate assistance  Toileting: Moderate assistance     Grooming/Bathing/Dressing Activities of Daily Living     Cognitive Retraining  Safety/Judgement: Awareness of environment; Fall prevention           Toileting  Toileting Assistance: Minimum assistance  Bladder Hygiene: Contact guard assistance  Clothing Management: Contact guard assistance  Adaptive Equipment: Elevated seat;Walker     Functional Transfers  Bathroom Mobility: Minimum assistance  Toilet Transfer : Contact guard assistance;Minimum assistance  Adaptive Equipment: Bedside commode     Bed/Mat Mobility  Supine to Sit: Moderate assistance; Additional time;Assist x2  Sit to Stand: Contact guard assistance;Minimum assistance  Stand to Sit: Contact guard assistance;Minimum assistance  Bed to Chair: Contact guard assistance;Minimum assistance  Scooting: Moderate assistance; Additional time;Assist x2         Physical Skills Involved:  1. Range of Motion  2. Balance  3. Strength Cognitive Skills Affected (resulting in the inability to perform in a timely and safe manner): 1. none Psychosocial Skills Affected:  1.  Environmental Adaptation   Number of elements that affect the Plan of Care: 3-5:  MODERATE COMPLEXITY   CLINICAL DECISION MAKIN[address] [place] [address] AM-PAC 6 Clicks   Daily Activity Inpatient Short Form  How much help from another person does the patient currently need. .. Total A Lot A Little None   1. Putting on and taking off regular lower body clothing? [] 1   [x] 2   [] 3   [] 4   2. Bathing (including washing, rinsing, drying)? [] 1   [x] 2   [] 3   [] 4   3. Toileting, which includes using toilet, bedpan or urinal?   [] 1   [] 2   [x] 3   [] 4   4. Putting on and taking off regular upper body clothing? [] 1   [] 2   [] 3   [x] 4   5. Taking care of personal grooming such as brushing teeth? [] 1   [] 2   [] 3   [x] 4   6. Eating meals? [] 1   [] 2   [] 3   [x] 4   © , Trustees of [address] [place] [address], under license to "[employer]. All rights reserved     Score:  Initial: 19 Most Recent: X (Date: -- )    Interpretation of Tool:  Represents activities that are increasingly more difficult (i.e. Bed mobility, Transfers, Gait). Use of outcome tool(s) and clinical judgement create a POC that gives a: LOW COMPLEXITY            TREATMENT:   (In addition to Assessment/Re-Assessment sessions the following treatments were rendered)     Pre-treatment Symptoms/Complaints:  pt up in room without complaint of pain  Pain: Initial:   Pain Intensity 1: 8  Pain Location 1: Hip  Pain Orientation 1: Right  Pain Intervention(s) 1: Ambulation/Increased Activity  Post Session:  8/10     Self Care: (20 min): Procedure(s) (per grid) utilized to improve and/or restore self-care/home management as related to toileting, grooming and functional transfers. Required minimal verbal and   cueing to facilitate activities of daily living skills and compensatory activities.        Treatment/Session Assessment:     Response to Treatment:  Pt up in terri son present    Education:  [] Home Exercises  [x] Fall Precautions  [x] Hip Precautions [] 5 [facility] Video  [] Knee/Hip Prosthesis Review  [x] Walker Management/Safety [x] Adaptive Equipment as Needed       Interdisciplinary Collaboration:   o Physical Therapy Assistant  o Occupational Therapist  o Registered Nurse  o Certified Nursing Assistant/Patient Care Technician    After treatment position/precautions:   o Up in chair  o Bed/Chair-wheels locked  o Call light within reach  o RN notified  o Family at bedside     Compliance with Program/Exercises: Compliant all of the time. Recommendations/Intent for next treatment session:  Treatment next visit will focus on increasing Ms. Waller's independence with bed mobility, transfers, self care, functional mobility, modalities for pain, and patient education.       Total Treatment Duration:20      time in [address]  Time out Balbir Haley OT

## 2021-06-16 NOTE — OP NOTES
[place]  OPERATIVE REPORT    Name:  Denise Champion  MR#:  [id number]  :  1965  ACCOUNT #:  [de-identified]  DATE OF SERVICE:  06/15/2021    PREOPERATIVE DIAGNOSIS:  Right hip degenerative joint disease. POSTOPERATIVE DIAGNOSIS:  Right hip degenerative joint disease. PROCEDURE PERFORMED:  Right total hip arthroplasty. SURGEON:  Mitch Banks MD    ASSISTANT:  None. ANESTHESIA:  Spinal.    COMPLICATIONS:  None. SPECIMENS REMOVED:  None. IMPLANTS:  See brief op note. ESTIMATED BLOOD LOSS:  Approximately 300 mL. BRIEF HISTORY:  The patient is a 25-year-old female well known to me with a long history of right hip pain and degenerative changes by x-ray findings consistent with severe DJD. She has opted for treatment in the form of total hip arthroplasty. She understands the risks to include infection, dislocation which may necessitate further surgery as well as leg length inequality, blood clots, blood loss, nerve and tendon damage, continued pain and other less common but potentially serious complications that may occur, and informed consent was obtained. PROCEDURE IN DETAIL:  The patient was seen in the preoperative area. Her hip was marked and chart was updated. She was taken to the operating room #6. Successful spinal anesthetic was achieved. She was placed in the right lateral decubitus position on a pegboard with a gel liner with all prominences well padded. The hip was prepped and draped to a sterile field. She received 2 g of TXA and 2 g of Ancef preoperatively. A time-out was performed by the operative team.  Once confirmed by the operative team, we proceeded utilizing a posterior approach to the hip with an incision 25 cm long centered over the trochanter extending laterally into the buttock area in a curvilinear fashion.   Dissection was carried through the skin and subcutaneous fat, the patient was noted to be morbidly obese, down to the tensor fascia rylee which was incised in line with the skin incision. The hip was held in external rotation. The external rotators were removed and tagged for repair as well as the capsule, which was T'd. The hip was then easily dislocated. After removal of the superior labrum, the femoral neck was osteotomized with an oscillating saw. The head removed to the back table, examined and noted to have severe degenerative changes especially in the weightbearing superior half. The acetabulum had soft tissue removed with a rongeur, the labrum removed circumferentially. We then reamed up to a 51 to provide a good bleeding bone. We placed  a 51 cup without complication. Turned our attention to the proximal femur where we broached and reamed and placed a #1 with a lateralized 0 offset, 51 and 28 mm inner head. We trialed this, extremely stable through all range of motion with good restoration of the patient's leg length. Removed the trial components to the back table. We thoroughly Pulsavac lavaged with 3 liters of Pulsavac lavage solution. We placed in our femoral stem, we placed on the head and neck construct, a Lopez taper tapped this in place, reduced the hip and again stable through range of motion. We thoroughly irrigated. We closed the external rotators and capsule through drill holes utilizing the Hewson suture passer in the proximal lateral trochanter. We then closed the tensor fascia with a self-retaining type suture and the deep with 0 Vicryl, 2-0 Vicryl, and clips on the skin. EBL was approximately 300 mL. No specimens, no drains, no complications noted. She will be admitted to our service.       MD ZEE Patino/[id number]  D:  06/15/2021 11:28  T:  06/15/2021 23:23  JOB #:  [id number]

## 2021-06-17 ENCOUNTER — APPOINTMENT (OUTPATIENT)
Dept: ULTRASOUND IMAGING | Age: 56
DRG: 470 | End: 2021-06-17
Attending: STUDENT IN AN ORGANIZED HEALTH CARE EDUCATION/TRAINING PROGRAM
Payer: MEDICARE

## 2021-06-17 VITALS
RESPIRATION RATE: 16 BRPM | WEIGHT: 246.7 LBS | BODY MASS INDEX: 42.12 KG/M2 | HEART RATE: 94 BPM | OXYGEN SATURATION: 97 % | HEIGHT: 64 IN | DIASTOLIC BLOOD PRESSURE: 91 MMHG | TEMPERATURE: 98.8 F | SYSTOLIC BLOOD PRESSURE: 137 MMHG

## 2021-06-17 LAB
APPEARANCE UR: CLEAR
BASOPHILS # BLD: 0 K/UL (ref 0–0.2)
BASOPHILS NFR BLD: 0 % (ref 0–2)
BILIRUB UR QL: NEGATIVE
COLOR UR: YELLOW
DIFFERENTIAL METHOD BLD: ABNORMAL
EOSINOPHIL # BLD: 0 K/UL (ref 0–0.8)
EOSINOPHIL NFR BLD: 0 % (ref 0.5–7.8)
ERYTHROCYTE [DISTWIDTH] IN BLOOD BY AUTOMATED COUNT: 14 % (ref 11.9–14.6)
GLUCOSE BLD STRIP.AUTO-MCNC: 82 MG/DL (ref 65–100)
GLUCOSE UR STRIP.AUTO-MCNC: NEGATIVE MG/DL
HCT VFR BLD AUTO: 31.2 % (ref 35.8–46.3)
HGB BLD-MCNC: 10 G/DL (ref 11.7–15.4)
HGB UR QL STRIP: NEGATIVE
IMM GRANULOCYTES # BLD AUTO: 0.1 K/UL (ref 0–0.5)
IMM GRANULOCYTES NFR BLD AUTO: 1 % (ref 0–5)
KETONES UR QL STRIP.AUTO: ABNORMAL MG/DL
LEUKOCYTE ESTERASE UR QL STRIP.AUTO: NEGATIVE
LYMPHOCYTES # BLD: 2.6 K/UL (ref 0.5–4.6)
LYMPHOCYTES NFR BLD: 16 % (ref 13–44)
MCH RBC QN AUTO: 29.6 PG (ref 26.1–32.9)
MCHC RBC AUTO-ENTMCNC: 32.1 G/DL (ref 31.4–35)
MCV RBC AUTO: 92.3 FL (ref 79.6–97.8)
MONOCYTES # BLD: 1.4 K/UL (ref 0.1–1.3)
MONOCYTES NFR BLD: 8 % (ref 4–12)
NEUTS SEG # BLD: 12.7 K/UL (ref 1.7–8.2)
NEUTS SEG NFR BLD: 75 % (ref 43–78)
NITRITE UR QL STRIP.AUTO: NEGATIVE
NRBC # BLD: 0 K/UL (ref 0–0.2)
PH UR STRIP: 5.5 [PH] (ref 5–9)
PLATELET # BLD AUTO: 267 K/UL (ref 150–450)
PMV BLD AUTO: 9.5 FL (ref 9.4–12.3)
PROCALCITONIN SERPL-MCNC: 0.81 NG/ML
PROT UR STRIP-MCNC: NEGATIVE MG/DL
RBC # BLD AUTO: 3.38 M/UL (ref 4.05–5.2)
SERVICE CMNT-IMP: NORMAL
SP GR UR REFRACTOMETRY: 1.02 (ref 1–1.02)
UROBILINOGEN UR QL STRIP.AUTO: 0.2 EU/DL (ref 0.2–1)
WBC # BLD AUTO: 16.9 K/UL (ref 4.3–11.1)

## 2021-06-17 PROCEDURE — 74011250637 HC RX REV CODE- 250/637: Performed by: ORTHOPAEDIC SURGERY

## 2021-06-17 PROCEDURE — 82962 GLUCOSE BLOOD TEST: CPT

## 2021-06-17 PROCEDURE — 85025 COMPLETE CBC W/AUTO DIFF WBC: CPT

## 2021-06-17 PROCEDURE — 84145 PROCALCITONIN (PCT): CPT

## 2021-06-17 PROCEDURE — 97110 THERAPEUTIC EXERCISES: CPT

## 2021-06-17 PROCEDURE — 97535 SELF CARE MNGMENT TRAINING: CPT

## 2021-06-17 PROCEDURE — 2709999900 HC NON-CHARGEABLE SUPPLY

## 2021-06-17 PROCEDURE — 93970 EXTREMITY STUDY: CPT

## 2021-06-17 PROCEDURE — 74011250637 HC RX REV CODE- 250/637: Performed by: NURSE PRACTITIONER

## 2021-06-17 PROCEDURE — 97116 GAIT TRAINING THERAPY: CPT

## 2021-06-17 RX ADMIN — HYDROCODONE BITARTRATE AND ACETAMINOPHEN 1 TABLET: 10; 325 TABLET ORAL at 07:32

## 2021-06-17 RX ADMIN — ZOLPIDEM TARTRATE 5 MG: 5 TABLET ORAL at 01:31

## 2021-06-17 RX ADMIN — Medication 81 MG: at 09:07

## 2021-06-17 RX ADMIN — CLONIDINE HYDROCHLORIDE 0.1 MG: 0.1 TABLET ORAL at 09:07

## 2021-06-17 RX ADMIN — DOCUSATE SODIUM 50MG AND SENNOSIDES 8.6MG 2 TABLET: 8.6; 5 TABLET, FILM COATED ORAL at 09:07

## 2021-06-17 RX ADMIN — HYDROCODONE BITARTRATE AND ACETAMINOPHEN 1 TABLET: 10; 325 TABLET ORAL at 11:38

## 2021-06-17 RX ADMIN — HYDROCODONE BITARTRATE AND ACETAMINOPHEN 1 TABLET: 10; 325 TABLET ORAL at 15:30

## 2021-06-17 RX ADMIN — Medication 1 AMPULE: at 09:08

## 2021-06-17 RX ADMIN — PIOGLITAZONE 15 MG: 15 TABLET ORAL at 07:32

## 2021-06-17 RX ADMIN — HYDROCODONE BITARTRATE AND ACETAMINOPHEN 1 TABLET: 10; 325 TABLET ORAL at 01:31

## 2021-06-17 RX ADMIN — DIPHENHYDRAMINE HYDROCHLORIDE 25 MG: 25 CAPSULE ORAL at 13:21

## 2021-06-17 RX ADMIN — METFORMIN HYDROCHLORIDE 1000 MG: 500 TABLET ORAL at 07:32

## 2021-06-17 RX ADMIN — METFORMIN HYDROCHLORIDE 1000 MG: 500 TABLET ORAL at 17:40

## 2021-06-17 RX ADMIN — Medication 10 ML: at 05:37

## 2021-06-17 RX ADMIN — GLIPIZIDE 5 MG: 5 TABLET ORAL at 07:32

## 2021-06-17 NOTE — PROGRESS NOTES
Problem: Self Care Deficits Care Plan (Adult)  Goal: *Acute Goals and Plan of Care (Insert Text)  Outcome: Progressing Towards Goal  Note: GOALS:   DISCHARGE GOALS (in preparation for going home/rehab):  3 days  1. Ms. Anny Samuels will perform one lower body dressing activity with minimal assistance with adaptive equipment to demonstrate improved functional mobility and safety. PROGRESSING  2. Ms. Anny Samuels will perform one lower body bathing activity with minimal  assistance with adaptive equipment to demonstrate improved functional mobility and safety. PROGRESSING  3. Ms. Anny Samuels will perform toileting/toilet transfer with contact guard assistance with adaptive equipment to demonstrate improved functional mobility and safety. GOAL MET 6/17/2021    4. Ms. Anny Samuels will perform shower transfer with contact guard assistance with adaptive equipment to demonstrate improved functional mobility and safety. 5.  Ms. Anny Samuels will state RICKY precautions with two verbal cues to demonstrate improved functional mobility and safety. PROGRESSING       JOINT CAMP OCCUPATIONAL THERAPY RICKY: Daily Note, Treatment Day: 4th and AM 6/17/2021  INPATIENT: Hospital Day: 3  Payor: Yesica Manjarrez / Plan: [address] [place] [address] HMO / Product Type: Managed Care Medicare /      NAME/AGE/GENDER: Erna Blood is a 54 y.o. female   PRIMARY DIAGNOSIS:  Primary osteoarthritis of right hip [M16.11]   Procedure(s) and Anesthesia Type:     * HIP ARTHROPLASTY TOTAL/ RIGHT - Spinal (Right)  ICD-10: Treatment Diagnosis:    · Pain in Right Hip (M25.551)  · Stiffness of Right Hip, Not elsewhere classified (M25.651)      ASSESSMENT:      She was up in bathroom toileting. She stood with CGA and performed hygiene. She ambulated to the sink and washed her hands. She then ambulated to the recliner with SBA. She sat in recliner and was educated on Hip precautions. OT to return later this am for full shower. All needs in reach, Fiance present.  Patient moving better today. 1100 Patient sponge bathed from recliner. She donned clothes as charted below. She  transferred back to bed with SBA/CGA using walker. And was CGA to  Min assist for sit to supine. She is going for an U/S to r/o DVT in R LE. OT to check on her this afternoon for discharge plan. Feeling much better. All needs in reach. 1450 waiting for results of U/S. This section established at most recent assessment   PROBLEM LIST (Impairments causing functional limitations):  1. Decreased Strength  2. Decreased ADL/Functional Activities  3. Decreased Transfer Abilities  4. Increased Pain  5. Increased Fatigue  6. Decreased Flexibility/Joint Mobility  7. Decreased Knowledge of Precautions   INTERVENTIONS PLANNED: (Benefits and precautions of occupational therapy have been discussed with the patient.)  1. Activities of daily living training  2. Adaptive equipment training  3. Balance training  4. Clothing management  5. Donning&doffing training  6. Theraputic activity     TREATMENT PLAN: Frequency/Duration: Follow patient 1-2 times to address above goals. Rehabilitation Potential For Stated Goals: Good     RECOMMENDED REHABILITATION/EQUIPMENT: (at time of discharge pending progress): Continue Skilled Therapy. OCCUPATIONAL PROFILE AND HISTORY:   History of Present Injury/Illness (Reason for Referral): Pt presents this date s/p (right) RICKY. Past Medical History/Comorbidities:   Ms. Lulu Ortiz  has a past medical history of Anxiety, Asthma, Chronic pain, DDD (degenerative disc disease), Depression, Diabetes mellitus ([facility] [address] Fibromyalgia, History of COVID-19 (05/10/2021), HTN (hypertension), and Hypercholesteremia. Ms. Lulu Ortiz  has a past surgical history that includes hx total abdominal hysterectomy; hx knee replacement (Left); hx carpal tunnel release; hx other surgical; hx cyst removal; and hx cholecystectomy.   Social History/Living Environment:   Home Environment: Private residence  # Steps to Enter: 0  One/Two Story Residence: One story  Living Alone: No  Support Systems: Spouse/Significant Other/Partner  Patient Expects to be Discharged to[de-identified] House    Prior Level of Function/Work/Activity:  Pt was (I) with ADL's and ambulated short distances. Number of Personal Factors/Comorbidities that affect the Plan of Care: Brief history (0):  LOW COMPLEXITY   ASSESSMENT OF OCCUPATIONAL PERFORMANCE[de-identified]   Most Recent Physical Functioning:   Balance  Sitting: Intact  Standing: With support                              Mental Status  Neurologic State: Alert; Appropriate for age  Orientation Level: Appropriate for age  Cognition: Appropriate decision making; Appropriate for age attention/concentration; Appropriate safety awareness; Follows commands  Perception: Appears intact  Perseveration: No perseveration noted  Safety/Judgement: Awareness of environment; Fall prevention                Basic ADLs (From Assessment) Complex ADLs (From Assessment)   Basic ADL  Feeding: Independent  Oral Facial Hygiene/Grooming: Supervision  Bathing: Moderate assistance  Type of Bath: Chlorhexidine (CHG), Bath Pack  Upper Body Dressing: Supervision  Lower Body Dressing: Moderate assistance  Toileting: Moderate assistance     Grooming/Bathing/Dressing Activities of Daily Living   Grooming  Grooming Assistance: Supervision  Position Performed: Seated in chair  Washing Face: Supervision  Washing Hands: Supervision  Brushing Teeth: Supervision Cognitive Retraining  Safety/Judgement: Awareness of environment; Fall prevention   Upper Body Bathing  Bathing Assistance: Supervision  Position Performed: Seated in chair     Lower Body Bathing  Bathing Assistance: Moderate assistance  Perineal  : Stand-by assistance;Contact guard assistance  Position Performed: Standing  Adaptive Equipment: Walker  Lower Body :  Moderate assistance  Position Performed: Seated in chair Toileting  Toileting Assistance: Stand-by assistance  Bladder Hygiene: Stand-by assistance  Clothing Management: Contact guard assistance   Upper Body Dressing Assistance  Dressing Assistance: [address]: Supervision  Pullover Shirt: Supervision Functional Transfers  Bathroom Mobility: Stand-by assistance  Toilet Transfer : Stand-by assistance;Contact guard assistance   Lower Body Dressing Assistance  Dressing Assistance: Moderate assistance  Pants With Elastic Waist: Moderate assistance Bed/Mat Mobility  Supine to Sit: Contact guard assistance;Minimum assistance  Sit to Stand: Stand-by assistance;Contact guard assistance  Stand to Sit: Stand-by assistance  Bed to Chair: Stand-by assistance         Physical Skills Involved:  1. Range of Motion  2. Balance  3. Strength Cognitive Skills Affected (resulting in the inability to perform in a timely and safe manner): 1. none Psychosocial Skills Affected:  1. Environmental Adaptation   Number of elements that affect the Plan of Care: 3-5:  MODERATE COMPLEXITY   CLINICAL DECISION MAKING:   [facility] [employer] AM-PAC 6 Clicks   Daily Activity Inpatient Short Form  How much help from another person does the patient currently need. .. Total A Lot A Little None   1. Putting on and taking off regular lower body clothing? [] 1   [x] 2   [] 3   [] 4   2. Bathing (including washing, rinsing, drying)? [] 1   [x] 2   [] 3   [] 4   3. Toileting, which includes using toilet, bedpan or urinal?   [] 1   [] 2   [x] 3   [] 4   4. Putting on and taking off regular upper body clothing? [] 1   [] 2   [] 3   [x] 4   5. Taking care of personal grooming such as brushing teeth? [] 1   [] 2   [] 3   [x] 4   6. Eating meals? [] 1   [] 2   [] 3   [x] 4   © 2007, Trustees of [facility] [employer], under license to [employer]. All rights reserved     Score:  Initial: 19 Most Recent: X (Date: -- )    Interpretation of Tool:  Represents activities that are increasingly more difficult (i.e. Bed mobility, Transfers, Gait).      Use of outcome tool(s) and clinical judgement create a POC that gives a: LOW COMPLEXITY            TREATMENT:   (In addition to Assessment/Re-Assessment sessions the following treatments were rendered)     Pre-treatment Symptoms/Complaints:  pt up in room without complaint of pain  Pain: Initial:   Pain Intensity 1: 8  Pain Location 1: Hip  Pain Orientation 1: Right  Pain Intervention(s) 1: Repositioned  Post Session:  8/10 rest     Self Care: (30 min): Procedure(s) (per grid) utilized to improve and/or restore self-care/home management as related to dressing, bathing, grooming and functional transfers. Required min to mod verbal and   cueing to facilitate activities of daily living skills and compensatory activities. Treatment/Session Assessment:     Response to Treatment:  Sponge bathed, dressed in bed U/S ordered for DVT r/o    Education:  [] Home Exercises  [x] Fall Precautions  [x] Hip Precautions [] Going Home Video  [] Knee/Hip Prosthesis Review  [x] Walker Management/Safety [x] Adaptive Equipment as Needed       Interdisciplinary Collaboration:   o Physical Therapist  o Occupational Therapist  o Registered Nurse  o Physician  o Certified Nursing Assistant/Patient Care Technician    After treatment position/precautions:   o Supine in bed  o Bed/Chair-wheels locked  o Bed in low position  o Call light within reach  o RN notified  o Side rails x 2     Compliance with Program/Exercises: Compliant all of the time. Recommendations/Intent for next treatment session:  Treatment next visit will focus on increasing Ms. Waller's independence with bed mobility, transfers, self care, functional mobility, modalities for pain, and patient education.       Total Treatment Duration:30  OT Patient Time In/Time Out  Time In: 1100  Time Out: [facility],

## 2021-06-17 NOTE — PROGRESS NOTES
Patient has a temp of 102.2, MD notified. Educated and instructed patient on the use of incentive spirometer. Patient is using it at this moment.

## 2021-06-17 NOTE — PROGRESS NOTES
Problem: Mobility Impaired (Adult and Pediatric)  Goal: *Acute Goals and Plan of Care (Insert Text)  Outcome: Progressing Towards Goal  Note: GOALS (1-4 days):  (1.)Ms. Wallre will move from supine to sit and sit to supine  in bed with STAND BY ASSIST.    (2.)Ms. Waller will transfer from bed to chair and chair to bed with STAND BY ASSIST using the least restrictive device. (3.)Ms. Waller will ambulate with STAND BY ASSIST for 100 feet with the least restrictive device. (4.)Ms. Waller will ambulate up/down 2 steps with bilateral  railing with MINIMAL ASSIST with no device. (5.)Ms. Waller will state/observe ABBY precautions with 0 verbal cues. ________________________________________________________________________________________________       PHYSICAL THERAPY JOINT CAMP ABBY: Daily Note and PM 6/17/2021  INPATIENT: Hospital Day: 3  Payor: Estella Arcos / Plan: [address] [place] [address] HMO / Product Type: [employer] /      NAME/AGE/GENDER: Everett Aschoff is a 54 y.o. female   PRIMARY DIAGNOSIS:  Primary osteoarthritis of right hip [M16.11]   Procedure(s) and Anesthesia Type:     * HIP ARTHROPLASTY TOTAL/ RIGHT - Spinal (Right)  ICD-10: Treatment Diagnosis:    · Pain in Right Hip (M25.551)  · Stiffness of Right Hip, Not elsewhere classified (M25.651)  · Difficulty in walking, Not elsewhere classified (R26.2)      ASSESSMENT:     Ms. Ray Kennedy presents with decreased functional mobility and gait as well as decreased rom and strength of right LE s/p right abby. She plans to go home with HHPT.  6/16 supine upon arrival.  Performs TH exercises with guidance and verbal cues to stay within hip precaution. Work on bed mobility as follows: supine>EOB with Mod A x 2 with extra time. Sat on EOB for few min. Then stood for few min with Min>CGA A with extra time. Then walk 20 ft using RW with Min>CGA A with extra time and verbal cues into the restroom. Independent with hygiene.   Then stood up and transfer to recliner. Therapist rolled pt out of the restroom. Left in recliner with needs in reach. Nurse came in to give pain med. 6/16 sitting in the recliner upon arrival.  Performs R TH exercises with guidance and verbal cues, with pain. Sit>slide out to edge of recliner with Min A and extra time and verbal cues. Then sit>stand with Min A and extra time. Then walk 10 ft using RW with Min A extra time and verbal cues. Sat on EOB and rested, then therapist got both leg and family member guided her back, so she could lay down. Then rested a few min. Therapist help put pt on left side lying with pillow behind back and one between her knees and needs in reach. The nurse was in to give her dilaudid IV. Hope to progress tomorrow with gait and exercises. 6/17- pt on hold this am to await doppler results. In pm, her doppler was negative. We worked on her RICKY exercises with verbal cues. Then worked on bed mobility and gait training in the becerra. Pt made it about 25 feet with RW and then had to sit, stated she feels her left leg is going to give out. Walked another 25 feet 3 more times with sitting rest breaks after each bout of gait. Back in room she wanted to get back in bed to await discharge. She has HHPT for follow up. This section established at most recent assessment   PROBLEM LIST (Impairments causing functional limitations):  1. Decreased Strength  2. Decreased ADL/Functional Activities  3. Decreased Transfer Abilities  4. Decreased Ambulation Ability/Technique  5. Decreased Balance  6. Increased Pain  7. Decreased Activity Tolerance  8. Decreased Flexibility/Joint Mobility  9. Decreased [place] with Home Exercise Program  10. Decreased strength   INTERVENTIONS PLANNED: (Benefits and precautions of physical therapy have been discussed with the patient.)  1. Bed Mobility  2. Cold  3. Gait Training  4. Home Exercise Program (HEP)  5. Range of Motion (ROM)  6.  Therapeutic Activites  7. Therapeutic Exercise/Strengthening  8. Transfer Training     TREATMENT PLAN: Frequency/Duration: Follow patient BID for duration of hospital stay to address above goals. Rehabilitation Potential For Stated Goals: Good     RECOMMENDED REHABILITATION/EQUIPMENT: (at time of discharge pending progress): Continue Skilled Therapy and Home Health: Physical Therapy. HISTORY:   History of Present Injury/Illness (Reason for Referral):  S/p right abby  Past Medical History/Comorbidities:   Ms. Ray Kennedy  has a past medical history of Anxiety, Asthma, Chronic pain, DDD (degenerative disc disease), Depression, Diabetes mellitus ([facility] [address] Fibromyalgia, History of COVID-19 (05/10/2021), HTN (hypertension), and Hypercholesteremia. Ms. Ray Kennedy  has a past surgical history that includes hx total abdominal hysterectomy; hx knee replacement (Left); hx carpal tunnel release; hx other surgical; hx cyst removal; and hx cholecystectomy. Social History/Living Environment:   Home Environment: Private residence  # Steps to Enter: 0  One/Two Story Residence: One story  Living Alone: No  Support Systems: Spouse/Significant Other/Partner  Patient Expects to be Discharged toF Cor[de-identified]ration  Current DME Used/Available at Home: None    Prior Level of Function/Work/Activity:  Using walker limited distance   Number of Personal Factors/Comorbidities that affect the Plan of Care: 1-2: MODERATE COMPLEXITY   EXAMINATION:   Most Recent Physical Functioning:                            Bed Mobility  Supine to Sit: Contact guard assistance; Additional time  Sit to Supine: Contact guard assistance;Minimum assistance; Additional time    Transfers  Sit to Stand: Stand-by assistance; Additional time  Stand to Sit: Stand-by assistance  Bed to Chair: Stand-by assistance    Balance  Sitting: Intact  Standing: Pull to stand; With support         Gait Training: Yes    Weight Bearing Status  Right Side Weight Bearing: As tolerated  Distance (ft): 25 Feet (ft) (4 times)  Ambulation - Level of Assistance: Stand-by assistance;Contact guard assistance  Assistive Device: Walker, rolling  Speed/Yeny: Pace decreased (<100 feet/min); Shuffled  Step Length: Left shortened;Right shortened  Stance: Right decreased  Gait Abnormalities: Antalgic;Decreased step clearance  Interventions: Safety awareness training;Verbal cues     Braces/Orthotics: none           Body Structures Involved:  1. Bones  2. Joints  3. Muscles  4. Ligaments Body Functions Affected:  1. Movement Related Activities and Participation Affected:  1. Mobility   Number of elements that affect the Plan of Care: 3: MODERATE COMPLEXITY   CLINICAL PRESENTATION:   Presentation: Stable and uncomplicated: LOW COMPLEXITY   CLINICAL DECISION MAKIN[address] [place] [id number] AM-PAC 6 Clicks   Basic Mobility Inpatient Short Form  How much difficulty does the patient currently have. .. Unable A Lot A Little None   1. Turning over in bed (including adjusting bedclothes, sheets and blankets)? [] 1   [] 2   [x] 3   [] 4   2. Sitting down on and standing up from a chair with arms ( e.g., wheelchair, bedside commode, etc.)   [] 1   [] 2   [x] 3   [] 4   3. Moving from lying on back to sitting on the side of the bed? [] 1   [] 2   [x] 3   [] 4   How much help from another person does the patient currently need. .. Total A Lot A Little None   4. Moving to and from a bed to a chair (including a wheelchair)? [] 1   [] 2   [x] 3   [] 4   5. Need to walk in hospital room? [] 1   [] 2   [x] 3   [] 4   6. Climbing 3-5 steps with a railing? [] 1   [x] 2   [] 3   [] 4   © , Trustees of [address] [place] [address] [address] [address], under license to [employer]. All rights reserved     Score:  Initial: 17 Most Recent: X (Date: -- )    Interpretation of Tool:  Represents activities that are increasingly more difficult (i.e. Bed mobility, Transfers, Gait).     Medical Necessity:     · Patient is expected to demonstrate progress in   · strength, range of motion, and balance  ·  to   · decrease assistance required with exercises and functional mobility  · . Reason for Services/Other Comments:  · Patient   · continues to require present interventions due to patient's inability to perform exercises and functional mobility independently  · . Use of outcome tool(s) and clinical judgement create a POC that gives a: Clear prediction of patient's progress: LOW COMPLEXITY            TREATMENT:   (In addition to Assessment/Re-Assessment sessions the following treatments were rendered)     Pre-treatment Symptoms/Complaints: painful  Pain Initial:   Pain Intensity 1: 8  Post Session: 8/10   assessment  Therapeutic Exercise: (15 Minutes):  Exercises per grid below to improve mobility and strength. Required minimal visual, verbal, and manual cues to promote proper body alignment, promote proper body posture, and promote proper body mechanics. Progressed range and repetitions as indicated. Gait Training (15 Minutes):  Gait training to improve and/or restore physical functioning as related to mobility. Ambulated 25 Feet (ft) (4 times) with Stand-by assistance;Contact guard assistance using a Walker, rolling and minimal Safety awareness training;Verbal cues related to their hip position and motion to promote proper body alignment.   .       Date:  6/15 Date:  6/16   Date:  6/17   ACTIVITY/EXERCISE AM PM AM PM AM PM   GROUP THERAPY  []  []  []  []  []  []   Ankle Pumps  10a 15  15 15    Quad Sets  10a 15 15 15    Gluteal Sets  10a 15 15 15    Hip ABd/ADduction  10aa 15 aa 15 aa 15    Straight Leg Raises         Knee Slides  10aa 15 aa 15 aa 15    Short Arc Quads   15 aa 15 aa 15    Long Arc Quads   15  painful 15    Chair Slides                  B = bilateral; AA = active assistive; A = active; P = passive      Treatment/Session Assessment:     Response to Treatment:  Pt did fair, going home today    Education:  [x] Home Exercises  [x] Fall Precautions  [x] Hip Precautions [] D/C Instruction Review  [x] Hip Prosthesis Review  [x] Walker Management/Safety [] Adaptive Equipment as Needed       Interdisciplinary Collaboration:   o Registered Nurse    After treatment position/precautions:   o Supine in bed  o Bed/Chair-wheels locked  o Bed in low position  o Caregiver at bedside  o Call light within reach  o Family at bedside    Compliance with Program/Exercises: Compliant most of the time, Will assess as treatment progresses. Recommendations/Intent for next treatment session:  Treatment next visit will focus on increasing Ms. Waller's independence with bed mobility, transfers, gait training, strength/ROM exercises, modalities for pain, and patient education.       Total Treatment Duration:  PT Patient Time In/Time Out  Time In: 3920  Time Out: MOHSEN/ Gaurang Quintero [address]

## 2021-06-17 NOTE — DISCHARGE INSTRUCTIONS
DISCHARGE SUMMARY from Nurse    The following personal items collected during your admission are returned to you:   Dental Appliance: Dental Appliances: None  Vision:    Hearing Aid:    Jewelry:    Clothing:    Other Valuables:    Valuables sent to safe:      PATIENT INSTRUCTIONS:    After general anesthesia or intravenous sedation, for 24 hours or while taking prescription Narcotics:  · Limit your activities  · Do not drive and operate hazardous machinery  · Do not make important personal or business decisions  · Do  not drink alcoholic beverages  · If you have not urinated within 8 hours after discharge, please contact your surgeon on call. Report the following to your surgeon:  · Excessive pain, swelling, redness or odor of or around the surgical area  · Temperature over 101  · Nausea and vomiting lasting longer than 4 hours or if unable to take medications  · Any signs of decreased circulation or nerve impairment to extremity: change in color, persistent  numbness, tingling, coldness or increase pain  · Any questions, call Dr. Talia Rosas. .            *  Please give a list of your current medications to your Primary Care Provider. *  Please update this list whenever your medications are discontinued, doses are      changed, or new medications (including over-the-counter products) are added. *  Please carry medication information at all times in case of emergency situations. Patient Education        Hip Replacement Surgery (Posterior): What to Expect at Home  Your Recovery  Hip replacement surgery replaces the worn parts of your hip joint. When you leave the hospital, you will probably be walking with crutches or a walker. You may be able to climb a few stairs and get in and out of bed and chairs. But you will need someone to help you at home until you have more energy and can move around better. You will go home with a bandage and stitches, staples, skin glue, or tape strips.  You can remove the bandage when your doctor tells you to. If you have stitches or staples, your doctor will remove them about 2 weeks after your surgery. Glue or tape strips will fall off on their own over time. You may still have some mild pain, and the area may be swollen for 3 to 4 months after surgery. Your doctor may give you medicine for the pain. You will continue the rehabilitation program (rehab) you started in the hospital. The better you do with your rehab exercises, the sooner you will get your strength and movement back. Most people are able to return to work 4 weeks to 4 months after surgery. This care sheet gives you a general idea about how long it will take for you to recover. But each person recovers at a different pace. Follow the steps below to get better as quickly as possible. How can you care for yourself at home? Activity    · Your doctor may not want your affected leg to cross the center of your body toward the other leg. If so, your therapist may suggest these ideas:  ? Do not cross your legs. ? Be very careful as you get in or out of bed or a car so your leg does not cross the imaginary line in the middle of your body.     · Go slowly when you climb stairs. Make sure the lights are on. Have someone watch you, if you can. When you climb stairs:  ? Step up first with your unaffected leg. Then bring the affected leg up to the same step. Bring your crutches or cane up. ? To go down stairs, reverse the order. First, put your crutches or cane on the lower step. Then bring the affected leg down to that step. Finally, step down with the unaffected leg.     · You can ride in a car, but stop at least once every hour to get out and walk around.     · You may want to sleep on your back. Don't reach down too far to pull up blankets when you lie in bed.     · If your doctor recommends exercises, do them as directed.  You can cut back on your exercises if your muscles start to ache, but don't stop doing them.     · Rest when you feel tired. You may take a nap, but don't stay in bed all day.     · Work with your physical therapist to learn the best way to exercise. You will probably have to use a walker, crutches, or a cane for at least 4 to 6 weeks.     · Your doctor may advise you to stay away from activities that put stress on the joint. This includes sports such as tennis, football, and jogging.     · Try not to sit for too long at one time. You will feel less stiff if you take a short walk about every hour. When you sit, use chairs with arms, and don't sit in low chairs.     · Do not bend over more than 90 degrees (like the angle in a letter \"L\").     · Sleep on your back with your legs slightly apart or on your side with a pillow between your knees for about 6 weeks or as your doctor tells you. Do not sleep on your stomach or affected leg.     · Ask your doctor when you can drive again.     · Most people are able to return to work 4 weeks to 4 months after surgery.     · Ask your doctor when it is okay for you to have sex. Diet    · By the time you leave the hospital, you will probably be eating your normal diet. Your doctor may recommend that you take iron and vitamin supplements.     · Drink plenty of fluids (unless your doctor tells you not to).   · Eat healthy foods, and watch your portion sizes. Try to stay at your ideal weight. Too much weight puts more stress on your new hip joint.     · You may notice that your bowel movements are not regular right after your surgery. This is common. Try to avoid constipation and straining with bowel movements. You may want to take a fiber supplement every day. If you have not had a bowel movement after a couple of days, ask your doctor about taking a mild laxative. Medicines    · Your doctor will tell you if and when you can restart your medicines.  You will also get instructions about taking any new medicines.     · If you take aspirin or some other blood thinner, ask your doctor if and when to start taking it again. Make sure that you understand exactly what your doctor wants you to do.     · Your doctor may give you a blood-thinning medicine to prevent blood clots. If you take a blood thinner, be sure you get instructions about how to take your medicine safely. Blood thinners can cause serious bleeding problems. This medicine could be in pill form or as a shot (injection). If a shot is necessary, your doctor will tell you how to do this.     · Be safe with medicines. Take pain medicines exactly as directed. ? If the doctor gave you a prescription medicine for pain, take it as prescribed. ? If you are not taking a prescription pain medicine, ask your doctor if you can take an over-the-counter medicine.     · If you think your pain medicine is making you sick to your stomach:  ? Take your medicine after meals (unless your doctor has told you not to). ? Ask your doctor for a different pain medicine.     · If your doctor prescribed antibiotics, take them as directed. Do not stop taking them just because you feel better. You need to take the full course of antibiotics. Incision care    · If your doctor told you how to care for your cut (incision), follow your doctor's instructions. You will have a dressing over the cut. A dressing helps the incision heal and protects it. Your doctor will tell you how to take care of this.     · If you did not get instructions, follow this general advice:  ? If you have strips of tape on the cut the doctor made, leave the tape on for a week or until it falls off.  ? If you have stitches or staples, your doctor will tell you when to come back to have them removed. ? If you have skin glue on the cut, leave it on until it falls off. Skin glue is also called skin adhesive or liquid stitches. ? Change the bandage every day. ? Wash the area daily with warm water, and pat it dry. Don't use hydrogen peroxide or alcohol. They can slow healing. ?  You may cover the area with a gauze bandage if it oozes fluid or rubs against clothing. ? You may shower 24 to 48 hours after surgery. Pat the incision dry. Don't swim or take a bath for the first 2 weeks, or until your doctor tells you it is okay. Exercise    · Your physical therapist will teach you exercises to do at home. Always do them as your therapist tells you.     · Avoid activities where you might fall. Ice and elevation    · For pain, put ice or a cold pack on the area for 10 to 20 minutes at a time. Put a thin cloth between the ice and your skin.     · Your ankle may swell for about 3 months. Prop up your ankle when you ice it or anytime you sit or lie down. Try to keep it above the level of your heart. This will help reduce swelling. Other instructions    · Wear compression stockings if your doctor told you to. These may help to prevent blood clots. Your doctor will tell you how long you need to keep wearing the compression stockings.     · Try to prevent falls. To avoid falling:  ? Arrange furniture so that you will not trip on it. ? Get rid of throw rugs, and move electrical cords out of the way. ? Walk only in areas with plenty of light. ? Put grab bars in showers and bathtubs. ? Try to avoid icy or snowy sidewalks. Choose shoes with good traction, or consider using traction devices that attach to your shoes. ? Wear shoes with sturdy, flat soles. Follow-up care is a key part of your treatment and safety. Be sure to make and go to all appointments, and call your doctor if you are having problems. It's also a good idea to know your test results and keep a list of the medicines you take. When should you call for help? Call 911 anytime you think you may need emergency care. For example, call if:    · You passed out (lost consciousness).     · You have severe trouble breathing.     · You have sudden chest pain and shortness of breath, or you cough up blood.    Call your doctor now or seek immediate medical care if:    · You have signs that your hip may be dislocated, including:  ? Severe pain and not being able to stand. ? A crooked leg that looks like your hip is out of position. ? Not being able to bend or straighten your leg.     · Your leg or foot is cool or pale or changes color.     · You cannot feel or move your leg.     · You have signs of a blood clot, such as:  ? Pain in your calf, back of the knee, thigh, or groin. ? Redness and swelling in your leg or groin.     · Your incision comes open and begins to bleed, or the bleeding increases.     · You feel like your heart is racing or beating irregularly.     · You have signs of infection, such as:  ? Increased pain, swelling, warmth, or redness. ? Red streaks leading from the incision. ? Pus draining from the incision. ? A fever. Watch closely for changes in your health, and be sure to contact your doctor if:    · You do not have a bowel movement after taking a laxative.     · You do not get better as expected. Where can you learn more? Go to http://www.gray.com/  Enter Q746 in the search box to learn more about \"Hip Replacement Surgery (Posterior): What to Expect at Home. \"  Current as of: November 16, 2020               Content Version: 12.8  © 2006-2021 [employer]. Care instructions adapted under license by [employer] (which disclaims liability or warranty for this information). If you have questions about a medical condition or this instruction, always ask your healthcare professional. [place] [address] any warranty or liability for your use of this information. These are general instructions for a healthy lifestyle:    No smoking/ No tobacco products/ Avoid exposure to second hand smoke    Surgeon General's Warning:  Quitting smoking now greatly reduces serious risk to your health.     Obesity, smoking, and sedentary lifestyle greatly increases your risk for illness    A healthy diet, regular physical exercise & weight monitoring are important for maintaining a healthy lifestyle    You may be retaining fluid if you have a history of heart failure or if you experience any of the following symptoms:  Weight gain of 3 pounds or more overnight or 5 pounds in a week, increased swelling in our hands or feet or shortness of breath while lying flat in bed. Please call your doctor as soon as you notice any of these symptoms; do not wait until your next office visit. Recognize signs and symptoms of STROKE:    F-face looks uneven    A-arms unable to move or move even    S-speech slurred or non-existent    T-time-call 911 as soon as signs and symptoms begin-DO NOT go       Back to bed or wait to see if you get better-TIME IS BRAIN. The discharge information has been reviewed with the patient. The patient verbalized understanding.

## 2021-06-17 NOTE — PROGRESS NOTES
[facility]    INTERVAL HISTORY  / Subjective:  Patient suffered from fever and chills overnight with T-max 102.2. She was also tachycardic more than her baseline. Patient denies any dysuria, increased or decreased urination, abdominal pain, diarrhea, chest pain or pressure,  cough, congestion, mucus production, new rash or ulcer. Assessment / Plan:  Meet Cates is a 54 y.o. female, who  has a past medical history of Anxiety, Asthma, Chronic pain, DDD (degenerative disc disease), Depression, Diabetes mellitus ([facility] [address] Fibromyalgia, History of COVID-19 (05/10/2021), HTN (hypertension), and Hypercholesteremia.,  has a past surgical history that includes hx total abdominal hysterectomy; hx knee replacement (Left); hx carpal tunnel release; hx other surgical; hx cyst removal; and hx cholecystectomy.  Who presents with scheduled right hip arthroplasty for end-stage right hip osteoarthritis         End-stage right hip osteoarthritis status post right total hip arthroplasty with Dr. Pete Abdalla on La 15  aspirin 81 mg twice daily for DVT prophylaxis, continue pain control, follow physical therapy occupational therapy evaluation    Post-op fevers  - CXR clear, UA unremarkable, procal pending  - WBC up to 16.9, Blood Cultures drawn  - check Duplex US to rule out DVT  - encouraged ICS use for likely post-op atelectasis as cause of fevers     History of diabetes type 2on Metformin and glipizide Actos resumed, renal functions reassuring, consider additional management glucose greater than 180     History of bipolar 1resume home meds     History hypertensions on losartan and clonidine resume prior although primary, continue monitor adjust as needed     Morbid obesity BMI of 42 due to excess calorieslifestyle education regarding diet           Objective:  Visit Vitals  /85 (BP 1 Location: Left upper arm, BP Patient Position: At rest)   Pulse 100   Temp 97.7 °F (36.5 °C)   Resp 16 Ht 5' 4\" (1.626 m)   Wt 111.9 kg (246 lb 11.2 oz)   SpO2 97%   Breastfeeding No   BMI 42.35 kg/m²                 Physical Exam:  General: No acute distress, speaking in full sentences, no use of accessory muscles   HEENT: Pupils equal and reactive to light and accommodation, oropharynx is clear   Neck: Supple, no lymphadenopathy, no JVD   Lungs: Clear to auscultation bilaterally   Cardiovascular: Regular rate and rhythm with normal S1 and S2   Abdomen: Soft, nontender, nondistended, normoactive bowel sounds   Extremities: No cyanosis clubbing or edema .  Tenderness to palpation of right calf  Neuro: Nonfocal, A&O x3   Psych: Normal affect     Intake and Output:  Date 06/16/21 0700 - 06/17/21 0659 06/17/21 0700 - 06/18/21 0659   Shift 6149-7132 8884-8623 24 Hour Total 4487-3474 5883-2044 24 Hour Total   INTAKE   Shift Total(mL/kg)         OUTPUT   Urine(mL/kg/hr)  200(0.1) 200(0.1)        Urine Voided  200 200        Urine Occurrence(s) 1 x  1 x      Shift Total(mL/kg)  200(1.8) 200(1.8)      NET  -200 -200      Weight (kg) 111.9 111.9 111.9 111.9 111.9 111.9       LAB:  Admission on 06/15/2021   Component Date Value    Crossmatch Expiration 06/15/2021 06/18/2021,2359     ABO/Rh(D) 06/15/2021 O POSITIVE     Antibody screen 06/15/2021 NEG     Glucose (POC) 06/15/2021 63*    Performed by 06/15/2021 MoseleyRNJordan     Glucose (POC) 06/15/2021 60*    Performed by 06/15/2021 MoseleyRNJordan     Glucose (POC) 06/15/2021 41*    Performed by 06/15/2021 CoatsBrookeADN     Glucose (POC) 06/15/2021 37*    Performed by 06/15/2021 CoatsBrookeADN     Glucose (POC) 06/15/2021 70     Performed by 06/15/2021 CoatsBrookeADN     Glucose (POC) 06/15/2021 61*    Performed by 06/15/2021 KennedyBright     Glucose (POC) 06/15/2021 80     Performed by 06/15/2021 Francoline     Glucose (POC) 06/15/2021 32*    Performed by [facility]/2021 [facility]     Glucose (POC) 06/15/2021 73     Performed by 06/15/2021 MortonRNChristyJacqueline     Glucose (POC) 06/15/2021 112*    Performed by 06/15/2021 MortonRNChristyJacqueline     Glucose (POC) 06/15/2021 130*    Performed by 06/15/2021 CordovaYelinaADN     Glucose (POC) 06/15/2021 155*    Performed by 06/15/2021 MoralesMarlenyPCT     HGB 06/16/2021 9.9*    Hemoglobin A1c 06/16/2021 6.7*    Est. average glucose 06/16/2021 146     Glucose (POC) 06/16/2021 149*    Performed by 06/16/2021 MoralesMarlenyPCT     Glucose (POC) 06/16/2021 114*    Performed by 06/16/2021 KennedyBright     Color 06/16/2021 YELLOW     Appearance 06/16/2021 CLEAR     Specific gravity 06/16/2021 1.022     pH (UA) 06/16/2021 5.5     Protein 06/16/2021 Negative     Glucose 06/16/2021 Negative     Ketone 06/16/2021 TRACE*    Bilirubin 06/16/2021 Negative     Blood 06/16/2021 Negative     Urobilinogen 06/16/2021 0.2     Nitrites 06/16/2021 Negative     Leukocyte Esterase 06/16/2021 Negative     WBC 06/17/2021 16.9*    RBC 06/17/2021 3.38*    HGB 06/17/2021 10.0*    HCT 06/17/2021 31.2*    MCV 06/17/2021 92.3     MCH 06/17/2021 29.6     MCHC 06/17/2021 32.1     RDW 06/17/2021 14.0     PLATELET 70/83/3994 586     MPV 06/17/2021 9.5     ABSOLUTE NRBC 06/17/2021 0.00     DF 06/17/2021 AUTOMATED     NEUTROPHILS 06/17/2021 75     LYMPHOCYTES 06/17/2021 16     MONOCYTES 06/17/2021 8     EOSINOPHILS 06/17/2021 0*    BASOPHILS 06/17/2021 0     IMMATURE GRANULOCYTES 06/17/2021 1     ABS. NEUTROPHILS 06/17/2021 12.7*    ABS. LYMPHOCYTES 06/17/2021 2.6     ABS. MONOCYTES 06/17/2021 1.4*    ABS. EOSINOPHILS 06/17/2021 0.0     ABS. BASOPHILS 06/17/2021 0.0     ABS. IMM. GRANS. 06/17/2021 0.1     Glucose (POC) 06/17/2021 82     Performed by 06/17/2021 BechtelRNDaphineM        IMAGING:  XR CHEST SNGL V    Result Date: 6/17/2021  No acute process. XR PELV AP ONLY    Result Date: 6/15/2021  1. Right hip prosthesis placement.        EKG:  No results found for this or any previous visit.           Christiano Ricardo MD  6/17/2021 6:42 PM

## 2021-06-17 NOTE — PROGRESS NOTES
Problem: Falls - Risk of  Goal: *Absence of Falls  Description: Document Kary Joseph Fall Risk and appropriate interventions in the flowsheet.   Outcome: Progressing Towards Goal  Note: Fall Risk Interventions:  Mobility Interventions: Bed/chair exit alarm, OT consult for ADLs, Patient to call before getting OOB, Utilize walker, cane, or other assistive device    Mentation Interventions: Bed/chair exit alarm, Adequate sleep, hydration, pain control, Evaluate medications/consider consulting pharmacy, Family/sitter at bedside, Familiar objects from home, More frequent rounding, Toileting rounds, Room close to nurse's station    Medication Interventions: Evaluate medications/consider consulting pharmacy, Patient to call before getting OOB, Teach patient to arise slowly, Bed/chair exit alarm    Elimination Interventions: Elevated toilet seat, Call light in reach, Bed/chair exit alarm, Toilet paper/wipes in reach, Toileting schedule/hourly rounds    History of Falls Interventions: Bed/chair exit alarm, Evaluate medications/consider consulting pharmacy, Investigate reason for fall, Room close to nurse's station, Door open when patient unattended

## 2021-06-17 NOTE — PROGRESS NOTES
Problem: Self Care Deficits Care Plan (Adult)  Goal: *Acute Goals and Plan of Care (Insert Text)  Outcome: Progressing Towards Goal  Note: GOALS:   DISCHARGE GOALS (in preparation for going home/rehab):  3 days  1. Ms. Randi Magallanes will perform one lower body dressing activity with minimal assistance with adaptive equipment to demonstrate improved functional mobility and safety. 2.  Ms. Randi Magallanes will perform one lower body bathing activity with minimal  assistance with adaptive equipment to demonstrate improved functional mobility and safety. 3.  Ms. Randi Magallanes will perform toileting/toilet transfer with contact guard assistance with adaptive equipment to demonstrate improved functional mobility and safety. GOAL MET 6/17/2021    4. Ms. Randi Magallanes will perform shower transfer with contact guard assistance with adaptive equipment to demonstrate improved functional mobility and safety. 5.  Ms. Randi Magallanes will state RICKY precautions with two verbal cues to demonstrate improved functional mobility and safety. PROGRESSING       JOINT CAMP OCCUPATIONAL THERAPY RICKY: Daily Note, Treatment Day: 3rd and AM 6/17/2021  INPATIENT: Hospital Day: 3  Payor: Romario Ibarra / Plan: [address] [place] [address] HMO / Product Type: Managed Care Medicare /      NAME/AGE/GENDER: Faith Moya is a 54 y.o. female   PRIMARY DIAGNOSIS:  Primary osteoarthritis of right hip [M16.11]   Procedure(s) and Anesthesia Type:     * HIP ARTHROPLASTY TOTAL/ RIGHT - Spinal (Right)  ICD-10: Treatment Diagnosis:    · Pain in Right Hip (M25.551)  · Stiffness of Right Hip, Not elsewhere classified (M25.651)      ASSESSMENT:      She was up in bathroom toileting. She stood with CGA and performed hygiene. She ambulated to the sink and washed her hands. She then ambulated to the recliner with SBA. She sat in recliner and was educated on Hip precautions. OT to return later this am for full shower. All needs in reach, Fiance present. Patient moving better today.      This section established at most recent assessment   PROBLEM LIST (Impairments causing functional limitations):  1. Decreased Strength  2. Decreased ADL/Functional Activities  3. Decreased Transfer Abilities  4. Increased Pain  5. Increased Fatigue  6. Decreased Flexibility/Joint Mobility  7. Decreased Knowledge of Precautions   INTERVENTIONS PLANNED: (Benefits and precautions of occupational therapy have been discussed with the patient.)  1. Activities of daily living training  2. Adaptive equipment training  3. Balance training  4. Clothing management  5. Donning&doffing training  6. Theraputic activity     TREATMENT PLAN: Frequency/Duration: Follow patient 1-2 times to address above goals. Rehabilitation Potential For Stated Goals: Good     RECOMMENDED REHABILITATION/EQUIPMENT: (at time of discharge pending progress): Continue Skilled Therapy. OCCUPATIONAL PROFILE AND HISTORY:   History of Present Injury/Illness (Reason for Referral): Pt presents this date s/p (right) RICKY. Past Medical History/Comorbidities:   Ms. Caren Arriola  has a past medical history of Anxiety, Asthma, Chronic pain, DDD (degenerative disc disease), Depression, Diabetes mellitus ([facility] [address] Fibromyalgia, History of COVID-19 (05/10/2021), HTN (hypertension), and Hypercholesteremia. Ms. Caren Arriola  has a past surgical history that includes hx total abdominal hysterectomy; hx knee replacement (Left); hx carpal tunnel release; hx other surgical; hx cyst removal; and hx cholecystectomy. Social History/Living Environment:   No data recorded  Prior Level of Function/Work/Activity:  Pt was (I) with ADL's and ambulated short distances.       Number of Personal Factors/Comorbidities that affect the Plan of Care: Brief history (0):  LOW COMPLEXITY   ASSESSMENT OF OCCUPATIONAL PERFORMANCE[de-identified]   Most Recent Physical Functioning:   Balance  Sitting: Intact  Standing: With support                              Mental Status  Neurologic State: Alert; Appropriate for age  Orientation Level: Appropriate for age  Cognition: Appropriate decision making; Appropriate for age attention/concentration; Appropriate safety awareness; Follows commands  Perception: Appears intact  Perseveration: No perseveration noted  Safety/Judgement: Awareness of environment; Fall prevention                Basic ADLs (From Assessment) Complex ADLs (From Assessment)   Basic ADL  Feeding: Independent  Oral Facial Hygiene/Grooming: Supervision  Bathing: Moderate assistance  Type of Bath: Patient refused  Upper Body Dressing: Supervision  Lower Body Dressing: Moderate assistance  Toileting: Moderate assistance     Grooming/Bathing/Dressing Activities of Daily Living   Grooming  Grooming Assistance: Stand-by assistance  Position Performed: Standing  Washing Hands: Stand-by assistance Cognitive Retraining  Safety/Judgement: Awareness of environment; Fall prevention           Toileting  Toileting Assistance: Stand-by assistance  Bladder Hygiene: Stand-by assistance  Clothing Management: Contact guard assistance     Functional Transfers  Bathroom Mobility: Stand-by assistance  Toilet Transfer : Stand-by assistance;Contact guard assistance     Bed/Mat Mobility  Sit to Stand: Stand-by assistance;Contact guard assistance  Stand to Sit: Contact guard assistance         Physical Skills Involved:  1. Range of Motion  2. Balance  3. Strength Cognitive Skills Affected (resulting in the inability to perform in a timely and safe manner): 1. none Psychosocial Skills Affected:  1. Environmental Adaptation   Number of elements that affect the Plan of Care: 3-5:  MODERATE COMPLEXITY   CLINICAL DECISION MAKING:   [employer] AM-PAC 6 Clicks   Daily Activity Inpatient Short Form  How much help from another person does the patient currently need. .. Total A Lot A Little None   1. Putting on and taking off regular lower body clothing? [] 1   [x] 2   [] 3   [] 4   2.   Bathing (including washing, rinsing, drying)? [] 1   [x] 2   [] 3   [] 4   3. Toileting, which includes using toilet, bedpan or urinal?   [] 1   [] 2   [x] 3   [] 4   4. Putting on and taking off regular upper body clothing? [] 1   [] 2   [] 3   [x] 4   5. Taking care of personal grooming such as brushing teeth? [] 1   [] 2   [] 3   [x] 4   6. Eating meals? [] 1   [] 2   [] 3   [x] 4   © 2007, Trustees of [facility], under license to [employer]. All rights reserved     Score:  Initial: 19 Most Recent: X (Date: -- )    Interpretation of Tool:  Represents activities that are increasingly more difficult (i.e. Bed mobility, Transfers, Gait). Use of outcome tool(s) and clinical judgement create a POC that gives a: LOW COMPLEXITY            TREATMENT:   (In addition to Assessment/Re-Assessment sessions the following treatments were rendered)     Pre-treatment Symptoms/Complaints:  pt up in room without complaint of pain  Pain: Initial:   Pain Intensity 1: 7  Pain Location 1: Hip  Pain Orientation 1: Right  Pain Intervention(s) 1: Ambulation/Increased Activity  Post Session:  8/10     Self Care: (25 min): Procedure(s) (per grid) utilized to improve and/or restore self-care/home management as related to toileting, grooming and functional transfers. Required minimal verbal and   cueing to facilitate activities of daily living skills and compensatory activities.        Treatment/Session Assessment:     Response to Treatment:  Pt up in reclinerterri present    Education:  [] Home Exercises  [x] Fall Precautions  [x] Hip Precautions [] Going Home Video  [] Knee/Hip Prosthesis Review  [x] Walker Management/Safety [x] Adaptive Equipment as Needed       Interdisciplinary Collaboration:   o Physical Therapist  o Occupational Therapist  o Registered Nurse  o Certified Nursing Assistant/Patient Care Technician    After treatment position/precautions:   o Up in chair  o Bed/Chair-wheels locked  o Call light within reach  o RN notified  o Family at bedside     Compliance with Program/Exercises: Compliant all of the time. Recommendations/Intent for next treatment session:  Treatment next visit will focus on increasing Ms. Christin's independence with bed mobility, transfers, self care, functional mobility, modalities for pain, and patient education.       Total Treatment Duration:25  OT Patient Time In/Time Out  Time In: 0740  Time Out: 0805   time in 1045  Time out Balbir Haley OT

## 2021-06-18 ENCOUNTER — HOME CARE VISIT (OUTPATIENT)
Dept: SCHEDULING | Facility: HOME HEALTH | Age: 56
End: 2021-06-18
Payer: MEDICARE

## 2021-06-18 VITALS
SYSTOLIC BLOOD PRESSURE: 125 MMHG | DIASTOLIC BLOOD PRESSURE: 72 MMHG | TEMPERATURE: 98.1 F | RESPIRATION RATE: 17 BRPM | HEART RATE: 68 BPM

## 2021-06-18 PROCEDURE — 3331090002 HH PPS REVENUE DEBIT

## 2021-06-18 PROCEDURE — G0151 HHCP-SERV OF PT,EA 15 MIN: HCPCS

## 2021-06-18 PROCEDURE — 3331090001 HH PPS REVENUE CREDIT

## 2021-06-18 PROCEDURE — 400013 HH SOC

## 2021-06-18 PROCEDURE — 400018 HH-NO PAY CLAIM PROCEDURE

## 2021-06-19 PROCEDURE — 3331090002 HH PPS REVENUE DEBIT

## 2021-06-19 PROCEDURE — 3331090001 HH PPS REVENUE CREDIT

## 2021-06-20 PROCEDURE — 3331090001 HH PPS REVENUE CREDIT

## 2021-06-20 PROCEDURE — 3331090002 HH PPS REVENUE DEBIT

## 2021-06-21 ENCOUNTER — HOME CARE VISIT (OUTPATIENT)
Dept: SCHEDULING | Facility: HOME HEALTH | Age: 56
End: 2021-06-21
Payer: MEDICARE

## 2021-06-21 VITALS — DIASTOLIC BLOOD PRESSURE: 70 MMHG | HEART RATE: 86 BPM | SYSTOLIC BLOOD PRESSURE: 138 MMHG | TEMPERATURE: 97.2 F

## 2021-06-21 PROCEDURE — 3331090002 HH PPS REVENUE DEBIT

## 2021-06-21 PROCEDURE — G0151 HHCP-SERV OF PT,EA 15 MIN: HCPCS

## 2021-06-21 PROCEDURE — 3331090001 HH PPS REVENUE CREDIT

## 2021-06-22 LAB
BACTERIA SPEC CULT: NORMAL
SERVICE CMNT-IMP: NORMAL

## 2021-06-22 PROCEDURE — 3331090001 HH PPS REVENUE CREDIT

## 2021-06-22 PROCEDURE — 3331090002 HH PPS REVENUE DEBIT

## 2021-06-23 ENCOUNTER — HOME CARE VISIT (OUTPATIENT)
Dept: SCHEDULING | Facility: HOME HEALTH | Age: 56
End: 2021-06-23
Payer: MEDICARE

## 2021-06-23 PROCEDURE — G0157 HHC PT ASSISTANT EA 15: HCPCS

## 2021-06-23 PROCEDURE — 3331090001 HH PPS REVENUE CREDIT

## 2021-06-23 PROCEDURE — 3331090002 HH PPS REVENUE DEBIT

## 2021-06-24 PROCEDURE — 3331090002 HH PPS REVENUE DEBIT

## 2021-06-24 PROCEDURE — 3331090001 HH PPS REVENUE CREDIT

## 2021-06-25 ENCOUNTER — HOME CARE VISIT (OUTPATIENT)
Dept: SCHEDULING | Facility: HOME HEALTH | Age: 56
End: 2021-06-25
Payer: MEDICARE

## 2021-06-25 VITALS
HEART RATE: 90 BPM | SYSTOLIC BLOOD PRESSURE: 120 MMHG | RESPIRATION RATE: 18 BRPM | TEMPERATURE: 97.1 F | DIASTOLIC BLOOD PRESSURE: 72 MMHG

## 2021-06-25 PROCEDURE — G0157 HHC PT ASSISTANT EA 15: HCPCS

## 2021-06-25 PROCEDURE — 3331090001 HH PPS REVENUE CREDIT

## 2021-06-25 PROCEDURE — 3331090002 HH PPS REVENUE DEBIT

## 2021-06-26 PROCEDURE — 3331090002 HH PPS REVENUE DEBIT

## 2021-06-26 PROCEDURE — 3331090001 HH PPS REVENUE CREDIT

## 2021-06-27 PROCEDURE — 3331090001 HH PPS REVENUE CREDIT

## 2021-06-27 PROCEDURE — 3331090002 HH PPS REVENUE DEBIT

## 2021-06-28 VITALS
SYSTOLIC BLOOD PRESSURE: 132 MMHG | RESPIRATION RATE: 18 BRPM | HEART RATE: 96 BPM | DIASTOLIC BLOOD PRESSURE: 80 MMHG | TEMPERATURE: 97.1 F

## 2021-06-28 PROCEDURE — 3331090001 HH PPS REVENUE CREDIT

## 2021-06-28 PROCEDURE — 3331090002 HH PPS REVENUE DEBIT

## 2021-06-29 ENCOUNTER — HOME CARE VISIT (OUTPATIENT)
Dept: SCHEDULING | Facility: HOME HEALTH | Age: 56
End: 2021-06-29
Payer: MEDICARE

## 2021-06-29 VITALS
TEMPERATURE: 96.9 F | RESPIRATION RATE: 18 BRPM | HEART RATE: 94 BPM | DIASTOLIC BLOOD PRESSURE: 64 MMHG | SYSTOLIC BLOOD PRESSURE: 110 MMHG | OXYGEN SATURATION: 98 %

## 2021-06-29 PROCEDURE — 3331090002 HH PPS REVENUE DEBIT

## 2021-06-29 PROCEDURE — 3331090001 HH PPS REVENUE CREDIT

## 2021-06-29 PROCEDURE — G0157 HHC PT ASSISTANT EA 15: HCPCS

## 2021-06-30 PROCEDURE — 3331090002 HH PPS REVENUE DEBIT

## 2021-06-30 PROCEDURE — 3331090001 HH PPS REVENUE CREDIT

## 2021-07-01 PROCEDURE — 3331090001 HH PPS REVENUE CREDIT

## 2021-07-01 PROCEDURE — 3331090002 HH PPS REVENUE DEBIT

## 2021-07-02 ENCOUNTER — HOME CARE VISIT (OUTPATIENT)
Dept: SCHEDULING | Facility: HOME HEALTH | Age: 56
End: 2021-07-02
Payer: MEDICARE

## 2021-07-02 ENCOUNTER — HOME CARE VISIT (OUTPATIENT)
Dept: HOME HEALTH SERVICES | Facility: HOME HEALTH | Age: 56
End: 2021-07-02
Payer: MEDICARE

## 2021-07-02 PROCEDURE — 3331090001 HH PPS REVENUE CREDIT

## 2021-07-02 PROCEDURE — 3331090002 HH PPS REVENUE DEBIT

## 2021-07-03 PROCEDURE — 3331090001 HH PPS REVENUE CREDIT

## 2021-07-03 PROCEDURE — 3331090002 HH PPS REVENUE DEBIT

## 2021-07-04 PROCEDURE — 3331090001 HH PPS REVENUE CREDIT

## 2021-07-04 PROCEDURE — 3331090002 HH PPS REVENUE DEBIT

## 2021-07-05 ENCOUNTER — HOME CARE VISIT (OUTPATIENT)
Dept: HOME HEALTH SERVICES | Facility: HOME HEALTH | Age: 56
End: 2021-07-05
Payer: MEDICARE

## 2021-07-05 ENCOUNTER — HOME CARE VISIT (OUTPATIENT)
Dept: SCHEDULING | Facility: HOME HEALTH | Age: 56
End: 2021-07-05
Payer: MEDICARE

## 2021-07-05 PROCEDURE — 3331090002 HH PPS REVENUE DEBIT

## 2021-07-05 PROCEDURE — G0157 HHC PT ASSISTANT EA 15: HCPCS

## 2021-07-05 PROCEDURE — 3331090001 HH PPS REVENUE CREDIT

## 2021-07-05 NOTE — CASE COMMUNICATION
patient is still stating pain is reaching a 7 or 8/10. She is walking better, but is no where near ready to give up her RW. Patient wants to go to [facility] for outpt therapy. We hare discharging on 7-8, so she can start outpatient therapy anytime after that.     Yue, [address] [place] [address]

## 2021-07-05 NOTE — CASE COMMUNICATION
Called patient evening of 7-1 to schedule PT for 7-2, patient stated she needed an appt at 4 pm or later, would not say why. Scheduled patient at time she asked for. ..about 10 minutes before I arrived, I received a text from patient stating she had been up all night with a stomach bug and had no sleep, and did not want any PT today. Called and notified Dr. Veena Barber office around 4 pm on Friday, 7-2.   Will see on Monday, 7-5

## 2021-07-06 VITALS
HEART RATE: 80 BPM | RESPIRATION RATE: 18 BRPM | OXYGEN SATURATION: 99 % | SYSTOLIC BLOOD PRESSURE: 128 MMHG | TEMPERATURE: 97.1 F | DIASTOLIC BLOOD PRESSURE: 70 MMHG

## 2021-07-06 PROCEDURE — 3331090001 HH PPS REVENUE CREDIT

## 2021-07-06 PROCEDURE — 3331090002 HH PPS REVENUE DEBIT

## 2021-07-07 PROCEDURE — 3331090002 HH PPS REVENUE DEBIT

## 2021-07-07 PROCEDURE — 3331090001 HH PPS REVENUE CREDIT

## 2021-07-08 ENCOUNTER — HOME CARE VISIT (OUTPATIENT)
Dept: SCHEDULING | Facility: HOME HEALTH | Age: 56
End: 2021-07-08
Payer: MEDICARE

## 2021-07-08 VITALS
TEMPERATURE: 97.7 F | HEART RATE: 88 BPM | DIASTOLIC BLOOD PRESSURE: 70 MMHG | RESPIRATION RATE: 18 BRPM | OXYGEN SATURATION: 97 % | SYSTOLIC BLOOD PRESSURE: 118 MMHG

## 2021-07-08 PROCEDURE — G0151 HHCP-SERV OF PT,EA 15 MIN: HCPCS

## 2021-07-08 PROCEDURE — 3331090002 HH PPS REVENUE DEBIT

## 2021-07-08 PROCEDURE — 3331090001 HH PPS REVENUE CREDIT

## 2023-01-01 NOTE — DISCHARGE SUMMARY
Dr. Breanna Goldstein  Discharge Summary      Patient ID:  Rowan Álvarez  [id number]  54 y.o.  1965    Admit date: 6/15/2021    Discharge date and time: 6/17/2021     Admitting Physician: Pavel Bruce MD     Discharge Physician: Curt Donald NP    Admission Diagnoses: Primary osteoarthritis of right hip [M16.11]  Osteoarthritis of right hip [M16.11]    Discharge Diagnoses:   Problem List as of 6/17/2021 Date Reviewed: 6/13/2021        Codes Class Noted - Resolved    * (Principal) Osteoarthritis of right hip (Chronic) ICD-10-CM: M16.11  ICD-9-CM: 715.95  6/13/2021 - Present              Surgeon: Curt Donald NP    Preoperative Medical Clearance: NA                          Perioperative Antibiotics: Ancef  x___                                                Vancomycin  ___      Postoperative Pain Management: Norco 10/325 mg one every 4 hours as needed for pain, #30 sent to pharmacy; [facility] in [place]    DVT Prophylaxis:   TERRANCE Hose                                  Plexi-Pulse    Postoperative transfusions:     none Banked PRBCs     Post Op complications: none    Hemoglobin at discharge:   Lab Results   Component Value Date/Time    HGB 10.0 (L) 06/17/2021 11:46 AM       Wound appears to be healing without any evidence of infection. Physical Therapy started on the day following surgery and progressed to independent ambulation with the aid of a walker. At the time of discharge, able to go up and down stairs and had understanding of precautions needed following surgery. PT at time of DC: maintaining hip precautions. Independent ambulation with walker     Current Discharge Medication List      CONTINUE these medications which have NOT CHANGED    Details   cloNIDine HCL (CATAPRES) 0.1 mg tablet Take 0.1 mg by mouth two (2) times a day. MULTIVITAMIN PO Take  by mouth daily. exenatide microspheres (Bydureon) 2 mg serr 2 mg by SubCUTAneous route every seven (7) days.  Indications: type 2 diabetes mellitus biotin 5,000 mcg TbDi Take  by mouth daily. glimepiride (AMARYL) 2 mg tablet Take 2 mg by mouth every morning. acetaminophen/diphenhydramine (TYLENOL PM EXTRA STRENGTH PO) Take  by mouth nightly. cholecalciferol, vitamin D3, (VITAMIN D3 PO) Take  by mouth daily. losartan (COZAAR) 50 mg tablet Take 100 mg by mouth daily. diclofenac potassium (CATAFLAM) 50 mg tablet Take 50 mg by mouth three (3) times daily as needed. metFORMIN (GLUCOPHAGE) 1,000 mg tablet Take 1,000 mg by mouth two (2) times daily (with meals). PIOGLITAZONE HCL (ACTOS PO) Take 15 mg by mouth daily. cyanocobalamin (VITAMIN B-12) 1,000 mcg/mL injection 1,000 mcg by IntraMUSCular route every thirty (30) days. melatonin 5 mg tablet Take 10 mg by mouth nightly.          STOP taking these medications       HYDROcodone-acetaminophen (NORCO) 7.5-325 mg per tablet Comments:   Reason for Stopping:         aspirin delayed-release 81 mg tablet Comments:   Reason for Stopping:               Discharged to: Home after Ultrasound of lower extremity ordered by Hospitalist     Discharge instructions:  - Anticoagulate with: ASA 81 BID for 30 days.   -Resume pre hospital diet             -Resume home medications per medical continuation form     -Ambulate with walker, appropriate total joint protocol  -Follow up in office as scheduled       Signed:  Michelle Richardson NP  6/17/2021  12:21 PM Statement Selected

## (undated) DEVICE — SUTURE VCRL SZ 0 L27IN ABSRB UD L36MM CP-1 1/2 CIR REV CUT J267H

## (undated) DEVICE — SUTURE 2 0 STRATAFIX SYMMETRIC PDS + 60CM CT 1 SXPP1A439

## (undated) DEVICE — SUTURE VCRL SZ 1 L27IN ABSRB UD L36MM CP-1 1/2 CIR REV CUT J268H

## (undated) DEVICE — 2108 SERIES SAGITTAL BLADE, GROUND (18.5 X 1.32 X 86.0MM)

## (undated) DEVICE — T4 HOOD

## (undated) DEVICE — HANDPIECE SET WITH COAXIAL HIGH FLOW TIP AND SUCTION TUBE: Brand: INTERPULSE

## (undated) DEVICE — SURGICAL PROCEDURE PACK TOT HIP CDS

## (undated) DEVICE — 3M™ COBAN™ SELF-ADHERENT WRAP, 1586S, STERILE, 6 IN X 5 YD (15 CM X 4,5 M), 12 ROLLS/CASE: Brand: 3M™ COBAN™

## (undated) DEVICE — RETRIEVER SUT ARTHSCP HOFFEE --

## (undated) DEVICE — BLADE SAW LG BNE 90X19X1.27 MM PARL STRYKR NS EZ BLADE DISP

## (undated) DEVICE — Device

## (undated) DEVICE — SUTURE PDS II SZ 1 L96IN ABSRB VLT TP-1 L65MM 1/2 CIR Z880G

## (undated) DEVICE — SUTURE VCRL SZ 2-0 L27IN ABSRB UD L36MM CP-1 1/2 CIR REV J266H

## (undated) DEVICE — NDL SPNE QNCKE 18GX3.5IN LF --

## (undated) DEVICE — SOLUTION IRRIG 3000ML 0.9% SOD CHL FLX CONT 0797208] ICU MEDICAL INC]

## (undated) DEVICE — 3000CC GUARDIAN II: Brand: GUARDIAN

## (undated) DEVICE — INTENDED FOR TISSUE SEPARATION, AND OTHER PROCEDURES THAT REQUIRE A SHARP SURGICAL BLADE TO PUNCTURE OR CUT.: Brand: BARD-PARKER SAFETY BLADES SIZE 10, STERILE

## (undated) DEVICE — REM POLYHESIVE ADULT PATIENT RETURN ELECTRODE: Brand: VALLEYLAB

## (undated) DEVICE — BUTTON SWITCH PENCIL BLADE ELECTRODE, HOLSTER: Brand: EDGE

## (undated) DEVICE — 2000CC GUARDIAN II: Brand: GUARDIAN

## (undated) DEVICE — SUTURE FIBERWIRE SZ 2 W/ TAPERED NEEDLE BLUE L38IN NONABSORB BLU L26.5MM 1/2 CIRCLE AR7200

## (undated) DEVICE — INTENDED FOR TISSUE SEPARATION, AND OTHER PROCEDURES THAT REQUIRE A SHARP SURGICAL BLADE TO PUNCTURE OR CUT.: Brand: BARD-PARKER SAFETY BLADES SIZE 15, STERILE

## (undated) DEVICE — SYR 50ML LR LCK 1ML GRAD NSAF --

## (undated) DEVICE — OCCLUSIVE GAUZE STRIP,3% BISMUTH TRIBROMOPHENATE IN PETROLATUM BLEND: Brand: XEROFORM

## (undated) DEVICE — (D)PREP SKN CHLRAPRP APPL 26ML -- CONVERT TO ITEM 371833

## (undated) DEVICE — TOTAL HIP DR LEE: Brand: MEDLINE INDUSTRIES, INC.

## (undated) DEVICE — DRAPE,HIP,W/POUCHES,STERILE: Brand: MEDLINE